# Patient Record
Sex: FEMALE | Race: WHITE | NOT HISPANIC OR LATINO | Employment: FULL TIME | URBAN - METROPOLITAN AREA
[De-identification: names, ages, dates, MRNs, and addresses within clinical notes are randomized per-mention and may not be internally consistent; named-entity substitution may affect disease eponyms.]

---

## 2017-03-02 ENCOUNTER — NURSE TRIAGE (OUTPATIENT)
Dept: ADMINISTRATIVE | Facility: CLINIC | Age: 37
End: 2017-03-02

## 2017-03-02 ENCOUNTER — TELEPHONE (OUTPATIENT)
Dept: INTERNAL MEDICINE | Facility: CLINIC | Age: 37
End: 2017-03-02

## 2017-03-02 NOTE — TELEPHONE ENCOUNTER
Called pt back she wants to be seen asap she is worred and wants to see her Dr she states that she is overwhelmed and depressed and does not know what else to do at this point. Scheduled for 3/6/17 at 1130

## 2017-03-02 NOTE — TELEPHONE ENCOUNTER
Reason for Disposition   Patient wants to be seen    Protocols used: ST DEPRESSION-A-OH  Patient states she is depressed. She is crying on and off. She is currently at work. Patient requesting to be seen on next Monday. Gave patient resources for urgent care or to call back if symptoms worsen.

## 2017-03-06 ENCOUNTER — OFFICE VISIT (OUTPATIENT)
Dept: INTERNAL MEDICINE | Facility: CLINIC | Age: 37
End: 2017-03-06
Payer: COMMERCIAL

## 2017-03-06 VITALS
SYSTOLIC BLOOD PRESSURE: 127 MMHG | DIASTOLIC BLOOD PRESSURE: 84 MMHG | WEIGHT: 150 LBS | HEART RATE: 70 BPM | HEIGHT: 65 IN | BODY MASS INDEX: 24.99 KG/M2

## 2017-03-06 DIAGNOSIS — F43.21 ADJUSTMENT DISORDER WITH DEPRESSED MOOD: Primary | ICD-10-CM

## 2017-03-06 PROCEDURE — 99214 OFFICE O/P EST MOD 30 MIN: CPT | Mod: S$GLB,,, | Performed by: INTERNAL MEDICINE

## 2017-03-06 PROCEDURE — 1160F RVW MEDS BY RX/DR IN RCRD: CPT | Mod: S$GLB,,, | Performed by: INTERNAL MEDICINE

## 2017-03-06 PROCEDURE — 99999 PR PBB SHADOW E&M-EST. PATIENT-LVL III: CPT | Mod: PBBFAC,,, | Performed by: INTERNAL MEDICINE

## 2017-03-06 RX ORDER — BUPROPION HYDROCHLORIDE 150 MG/1
150 TABLET ORAL DAILY
Qty: 30 TABLET | Refills: 11 | Status: SHIPPED | OUTPATIENT
Start: 2017-03-06 | End: 2018-03-22

## 2017-03-06 RX ORDER — CLONAZEPAM 1 MG/1
1 TABLET ORAL NIGHTLY
Qty: 10 TABLET | Refills: 0 | Status: SHIPPED | OUTPATIENT
Start: 2017-03-06 | End: 2018-03-22

## 2017-03-06 NOTE — MR AVS SNAPSHOT
[facility] [address]  [address]  [address]  [facility] [address]  Phone: [phone]                  Betty Bill   3/6/2017 11:30 AM   Office Visit    Description:  Female : 1980   Provider:  Stewart Hurd MD   Department:  [facility] [address]           Reason for Visit     Depression     Anxiety           Diagnoses this Visit        Comments    Adjustment disorder with depressed mood    -  Primary            To Do List           Goals (5 Years of Data)     None       These Medications        Disp Refills Start End    buPROPion (WELLBUTRIN XL) 150 MG TB24 tablet 30 tablet 11 3/6/2017 3/6/2018    Take 1 tablet (150 mg total) by mouth once daily. - Oral    Pharmacy: [facility] [address] [place] [address] [facility] [address] Ph #: [phone]       clonazePAM (KLONOPIN) 1 MG tablet 10 tablet 0 3/6/2017 3/16/2017    Take 1 tablet (1 mg total) by mouth every evening. - Oral    Ph[facility]: [facility] [address] [place] [address] [facility] [address] Ph #: [phone]         [facility]     [facility] -  Assistance  Registered nurses in the [facility] Center provide clinical advisement, health education, appointment booking, and other advisory services.  Call for this free service at [phone].             Medications           Message regarding Medications     Verify the changes and/or additions to your medication regime listed below are the same as discussed with your clinician today.  If any of these changes or additions are incorrect, please notify your healthcare provider.        START taking these NEW medications        Refills    buPROPion (WELLBUTRIN XL) 150 MG TB24 tablet 11    Sig: Take 1 tablet (150 mg total) by mouth once daily.    Class: Normal    Route: Oral    clonazePAM (KLONOPIN) 1 MG tablet 0    Sig: Take 1 tablet (1 mg total)  "by mouth every evening.    Class: Normal    Route: Oral           Verify that the below list of medications is an accurate representation of the medications you are currently taking.  If none reported, the list may be blank. If incorrect, please contact your healthcare provider. Carry this list with you in case of emergency.           Current Medications     amitriptyline (ELAVIL) 10 MG tablet Take 1 tablet (10 mg total) by mouth every evening.    amoxicillin (AMOXIL) 875 MG tablet Take 1 tablet (875 mg total) by mouth 2 (two) times daily.    buPROPion (WELLBUTRIN XL) 150 MG TB24 tablet Take 1 tablet (150 mg total) by mouth once daily.    clonazePAM (KLONOPIN) 1 MG tablet Take 1 tablet (1 mg total) by mouth every evening.    dextroamphetamine-amphetamine (ADDERALL) 20 mg tablet Take 1 tablet (20 mg total) by mouth 2 (two) times daily.    dextroamphetamine-amphetamine (ADDERALL) 20 mg tablet Take 1 tablet (20 mg total) by mouth 2 (two) times daily.    dextroamphetamine-amphetamine (ADDERALL) 20 mg tablet Take 1 tablet (20 mg total) by mouth 2 (two) times daily.           Clinical Reference Information           Your Vitals Were     BP Pulse Height Weight Last Period BMI    127/84 70 5' 5" (1.651 m) 68 kg (150 lb) 02/23/2017 24.96 kg/m2      Blood Pressure          Most Recent Value    BP  127/84      Allergies as of 3/6/2017     No Known Allergies      Immunizations Administered on Date of Encounter - 3/6/2017     None      [facility] Sign-Up     Activating your [facility] account is as easy as 1-2-3!     1) Visit [email or web address], select Sign Up Now, enter this activation code and your date of birth, then select Next.  [email or web address]  Expires: 4/20/2017 12:36 PM      2) Create a username and password to use when you visit [facility] in the future and select a security question in case you lose your password and select Next.    3) Enter your e-mail address and click Sign Up!    Additional Information  If you have " questions, please e-mail [email or web address] or call [phone] to talk to our [facility] staff. Remember, [facility] is NOT to be used for urgent needs. For medical emergencies, dial 911.         Smoking Cessation     If you would like to quit smoking:   You may be eligible for free services if you are a [place] resident and started smoking cigarettes before September 1, 1988.  Call the Smoking Cessation Trust ([facility]) toll free at (159) 164-3425 or (516) 273-7692.   Call 9-287-QUIT-NOW if you do not meet the above criteria.            Language Assistance Services     ATTENTION: Language assistance services are available, free of charge. Please call [phone].      ATENCIÓN: Si tony rojas, tiene a fatima disposición servicios gratuitos de asistencia lingüística. Llame al [phone].     CHÚ Ý: N?u b?n nói Ti?ng Vi?t, có các d?ch v? h? tr? ngôn ng? mi?n phí dành cho b?n. G?i s? [phone].         [facility] [address] complies with applicable Federal civil rights laws and does not discriminate on the basis of race, color, national origin, age, disability, or sex.

## 2017-03-06 NOTE — PROGRESS NOTES
REASON FOR VISIT:  This is a 36-year-old female.  For two months now, she has   been just feeling very down, depressed, tired all the time, not sleeping well,   and crying a lot.  She just feels overwhelmed with things in life with two   children, her , and work.  Her parents are both ill.  Her daughter is   getting  in August.  She has had bouts of depression in the past.  One   time Effexor was given, but she said it did not really help and she also gained   weight.  Physically, there has been no chest pain, shortness of breath, or   abdominal pain.  Bowel and urine function are fine.  Occasional headache.    PAST MEDICAL HISTORY:  Attention deficit disorder.  Depression, anxiety.  Tobacco use.    MEDICATIONS:  None.  In the past, she was on Adderall.    She was seen in the Emergency Room for numbness involving the extremities and   arms.  An evaluation was unrevealing including a drug panel other than alcohol   intake at the time.    SOCIAL HISTORY:  Tobacco use, about half a pack a day.  Alcohol use, very   limited.    PHYSICAL EXAMINATION:  VITAL SIGNS:  Weight is 150, pulse 72, blood pressure 130/82.  LUNGS:  Clear.  HEART:  Regular rate and rhythm.  ABDOMEN:  Active bowel sounds, soft, and nontender.  NECK:  No thyromegaly.  In the past, thyroid function was fine.    IMPRESSION:  Adjustment disorder with depressed features.    PLAN:  Wellbutrin  mg once a day for a month and clonazepam 1 mg at   bedtime.  In a couple of weeks, I would like her to give me a report on how she   is doing.    /sc [id number] review      JOSTIN/CROW  dd: 03/06/2017 12:34:24 (CST)  td: 03/07/2017 00:53:58 (CST)  Doc ID   #[id number]  Job ID #[id number]    CC:

## 2017-03-08 ENCOUNTER — TELEPHONE (OUTPATIENT)
Dept: INTERNAL MEDICINE | Facility: CLINIC | Age: 37
End: 2017-03-08

## 2017-03-08 NOTE — TELEPHONE ENCOUNTER
----- Message from Sarah Salazar MA sent at 3/8/2017 12:41 PM CST -----  Contact: self- [phone]  Pt is requesting to Extend her 's note until Tuesday 03/14/17.  Pls fax to [phone]...ATTN: Nell.

## 2017-03-08 NOTE — TELEPHONE ENCOUNTER
Called pt back. Pt states that her boss suggested that she take a medical leave until she can get back to herself. She is going to fax over the paper work needing to be signed.

## 2017-03-08 NOTE — TELEPHONE ENCOUNTER
Called pt back she is stating that she knows that the Dr may take a few days to fill out the paper work so she is asking for a Drs note excussing her for 3/7/17-3/14/17 to cover her for these days and then she is needing the papers filled out for an extended leave per her boss states that she is unstable and needs to take a leave of absences from work until the medication has time to take affect and she sttrs feeling back to her normal self. This paper work is needed to hold her job position while out of work.

## 2017-03-08 NOTE — TELEPHONE ENCOUNTER
----- Message from Ashlee Gorman sent at 3/7/2017 12:41 PM CST -----  Contact: self/[phone]  Pt called in regards to talking to the office about her depression.        Please advise

## 2017-03-15 ENCOUNTER — OFFICE VISIT (OUTPATIENT)
Dept: INTERNAL MEDICINE | Facility: CLINIC | Age: 37
End: 2017-03-15
Payer: COMMERCIAL

## 2017-03-15 VITALS
WEIGHT: 154 LBS | SYSTOLIC BLOOD PRESSURE: 125 MMHG | HEIGHT: 65 IN | BODY MASS INDEX: 25.66 KG/M2 | HEART RATE: 71 BPM | DIASTOLIC BLOOD PRESSURE: 80 MMHG

## 2017-03-15 DIAGNOSIS — F43.21 ADJUSTMENT DISORDER WITH DEPRESSED MOOD: ICD-10-CM

## 2017-03-15 DIAGNOSIS — F32.A DEPRESSION, UNSPECIFIED DEPRESSION TYPE: Primary | ICD-10-CM

## 2017-03-15 PROCEDURE — 99999 PR PBB SHADOW E&M-EST. PATIENT-LVL III: CPT | Mod: PBBFAC,,, | Performed by: INTERNAL MEDICINE

## 2017-03-15 PROCEDURE — 1160F RVW MEDS BY RX/DR IN RCRD: CPT | Mod: S$GLB,,, | Performed by: INTERNAL MEDICINE

## 2017-03-15 PROCEDURE — 99212 OFFICE O/P EST SF 10 MIN: CPT | Mod: S$GLB,,, | Performed by: INTERNAL MEDICINE

## 2017-03-15 NOTE — PROGRESS NOTES
REASON FOR VISIT:  Ms. Bill is here as a follow-up on depression in reference   to her visit on March 6.  She has been out of work since that time and does   require a form filled out for leave of absence.  She has had breakdowns at work   and to the point where her supervisor suggested that she take this.  She has not   seen much of a change with the Wellbutrin, although it has only been a week.    Clonazepam 1 mg at bedtime has been helpful for sleep.  She still feels very   down, depressed, crying, and agitated.    IMPRESSION:  Adjustment disorder with depressed features with possible major   depression.    PLAN:  We will refer her to the Behavioral Medical Unit and possibly Psychiatry   in the future.  For the time being, she can stay with Wellbutrin, and her form   was filled out.    /sc [id number] review      JAM/CROW  dd: 03/15/2017 12:29:39 (CDT)  td: 03/16/2017 00:39:28 (CDT)  Doc ID   #[id number]  Job ID #[id number]    CC:

## 2017-03-15 NOTE — MR AVS SNAPSHOT
[facility] [address]  [address]  [address]  [facility] [address]  Phone: [phone]                  Betty Bill   3/15/2017 11:30 AM   Office Visit    Description:  Female : 1980   Provider:  Stewart Hurd MD   Department:  [facility] [address]           Reason for Visit     Follow-up           Diagnoses this Visit        Comments    Depression, unspecified depression type    -  Primary     Adjustment disorder with depressed mood                To Do List           Goals (5 Years of Data)     None      [facility]     [facility] Care [facility] -  Assistance  Registered nurses in the [facility] provide clinical advisement, health education, appointment booking, and other advisory services.  Call for this free service at [phone].             Medications           Message regarding Medications     Verify the changes and/or additions to your medication regime listed below are the same as discussed with your clinician today.  If any of these changes or additions are incorrect, please notify your healthcare provider.             Verify that the below list of medications is an accurate representation of the medications you are currently taking.  If none reported, the list may be blank. If incorrect, please contact your healthcare provider. Carry this list with you in case of emergency.           Current Medications     amitriptyline (ELAVIL) 10 MG tablet Take 1 tablet (10 mg total) by mouth every evening.    amoxicillin (AMOXIL) 875 MG tablet Take 1 tablet (875 mg total) by mouth 2 (two) times daily.    buPROPion (WELLBUTRIN XL) 150 MG TB24 tablet Take 1 tablet (150 mg total) by mouth once daily.    clonazePAM (KLONOPIN) 1 MG tablet Take 1 tablet (1 mg total) by mouth every evening.    dextroamphetamine-amphetamine (ADDERALL) 20 mg tablet Take 1 tablet (20 mg total) by mouth 2 (two) times daily.    dextroamphetamine-amphetamine  "(ADDERALL) 20 mg tablet Take 1 tablet (20 mg total) by mouth 2 (two) times daily.    dextroamphetamine-amphetamine (ADDERALL) 20 mg tablet Take 1 tablet (20 mg total) by mouth 2 (two) times daily.           Clinical Reference Information           Your Vitals Were     BP Pulse Height Weight Last Period BMI    125/80 71 5' 5" (1.651 m) 69.9 kg (154 lb) 02/23/2017 25.63 kg/m2      Blood Pressure          Most Recent Value    BP  125/80      Allergies as of 3/15/2017     No Known Allergies      Immunizations Administered on Date of Encounter - 3/15/2017     None      Orders Placed During Today's Visit     Future Labs/Procedures Expected by Expires    Behavioral health day treatment  As directed 3/15/2018      [facility] Sign-Up     Activating your [facility] account is as easy as 1-2-3!     1) Visit [email or web address], select Sign Up Now, enter this activation code and your date of birth, then select Next.  [email or web address]  Expires: 4/20/2017  1:36 PM      2) Create a username and password to use when you visit [facility] in the future and select a security question in case you lose your password and select Next.    3) Enter your e-mail address and click Sign Up!    Additional Information  If you have questions, please e-mail [email or web address] or call [phone] to talk to our [facility] staff. Remember, [facility] is NOT to be used for urgent needs. For medical emergencies, dial 911.         Smoking Cessation     If you would like to quit smoking:   You may be eligible for free services if you are a [place] resident and started smoking cigarettes before September 1, 1988.  Call the Smoking Cessation Trust ([facility]) toll free at (688) 994-4326 or (853) 721-8993.   Call 1-800-QUIT-NOW if you do not meet the above criteria.            Language Assistance Services     ATTENTION: Language assistance services are available, free of charge. Please call [phone].      ATENCIÓN: Si manan fierro a fatima disposición " servicios gratuitos de asistencia lingüística. Maddie robertson [phone].     [facility] Ý: N?u b?n nói Ti?ng Vi?t, có các d?ch v? h? tr? ngôn ng? mi?n phí dành cho b?n. G?i s? [phone].         [facility] [address] complies with applicable Federal civil rights laws and does not discriminate on the basis of race, color, national origin, age, disability, or sex.

## 2017-06-29 ENCOUNTER — OFFICE VISIT (OUTPATIENT)
Dept: INTERNAL MEDICINE | Facility: CLINIC | Age: 37
End: 2017-06-29
Payer: COMMERCIAL

## 2017-06-29 VITALS
HEIGHT: 65 IN | BODY MASS INDEX: 26.04 KG/M2 | DIASTOLIC BLOOD PRESSURE: 82 MMHG | SYSTOLIC BLOOD PRESSURE: 129 MMHG | WEIGHT: 156.31 LBS | HEART RATE: 74 BPM

## 2017-06-29 DIAGNOSIS — R63.5 WEIGHT GAIN: ICD-10-CM

## 2017-06-29 DIAGNOSIS — F43.23 ADJUSTMENT DISORDER WITH MIXED ANXIETY AND DEPRESSED MOOD: Primary | ICD-10-CM

## 2017-06-29 PROCEDURE — 99999 PR PBB SHADOW E&M-EST. PATIENT-LVL III: CPT | Mod: PBBFAC,,, | Performed by: INTERNAL MEDICINE

## 2017-06-29 PROCEDURE — 99214 OFFICE O/P EST MOD 30 MIN: CPT | Mod: S$GLB,,, | Performed by: INTERNAL MEDICINE

## 2017-06-29 RX ORDER — PHENTERMINE HYDROCHLORIDE 37.5 MG/1
37.5 TABLET ORAL
Qty: 30 TABLET | Refills: 0 | Status: SHIPPED | OUTPATIENT
Start: 2017-06-29 | End: 2017-08-01 | Stop reason: SDUPTHER

## 2017-06-29 NOTE — PROGRESS NOTES
REASON FOR VISIT:  This is a 36-year-old female who comes in for follow-up from   her visit in March. She was started on Wellbutrin  mg.  Her situation has   improved.  She finds that the medicine has helped emotionally.  She is not as   depressed, down, or anxious.  She has a lot of positive circumstances.  She and   her  are now  and she is happy.  She is also living with her   parents and  she is now able to take care of them because they have illnesses.    Her daughter is getting ready to be  and this is a positive thing in her   life.  She is ready to go back to work.  The only concern she has is still her   weight.  In the past she took phentermine as needed.    PAST MEDICAL HISTORY:  Attention deficit disorder.  Anxiety disorder.    SOCIAL HISTORY:  She now smokes maybe a pack a day instead of two packs a day.    I was hoping that Wellbutrin would improve this.    PHYSICAL EXAMINATION:  VITAL SIGNS:  She has a weight of 156, pulse 72, blood pressure 130/72.  LUNGS:  Clear.  HEART:  Regular rate and rhythm.    IMPRESSION:  1.  Adjustment disorder with mixed features, improving.  2.  Weight gain.    PLAN:  We will give a prescription for phentermine 30 mg once a day.  She will   continue with Wellbutrin.  Discussed attempts to discontinue smoking and engage   in proper diet and physical activity.    /sc [id number] review      JAM/CROW  dd: 06/29/2017 11:27:13 (CDT)  td: 07/13/2017 13:13:09 (CDT)  Doc ID   #[id number]  Job ID #[id number]    CC:

## 2017-08-01 ENCOUNTER — TELEPHONE (OUTPATIENT)
Dept: INTERNAL MEDICINE | Facility: CLINIC | Age: 37
End: 2017-08-01

## 2017-08-01 RX ORDER — PHENTERMINE HYDROCHLORIDE 37.5 MG/1
37.5 TABLET ORAL
Qty: 30 TABLET | Refills: 0 | Status: SHIPPED | OUTPATIENT
Start: 2017-08-01 | End: 2017-08-25 | Stop reason: SDUPTHER

## 2017-08-01 NOTE — TELEPHONE ENCOUNTER
----- Message from Toi Wolfe sent at 8/1/2017  1:06 PM CDT -----  Contact: [phone]  Type: Rx    Name of medication(s): phentermine (ADIPEX-P) 37.5 mg tablet    Is this a refill? New rx? Refill    Who prescribed medication? Dr Hurd    [facility]    Comments:Please advice    Thanks

## 2017-08-25 NOTE — TELEPHONE ENCOUNTER
----- Message from Vin Elkinsr sent at 8/25/2017  2:53 PM CDT -----  Contact: self/[phone] cell  Type: Rx    Name of medication(s): phentermine (ADIPEX-P) 37.5 mg tablet    Is this a refill? New rx? refill    Who prescribed medication? Dr. Hurd    Pharmacy Name, Phone, & Location: [facility] on file    Comments: pt would like to request a refill on the medication above.  She would like to request that it be sent off today, because she will be leaving [facility] on Monday. Please call and advise.  Thank you

## 2017-08-27 RX ORDER — PHENTERMINE HYDROCHLORIDE 37.5 MG/1
37.5 TABLET ORAL
Qty: 30 TABLET | Refills: 0 | Status: SHIPPED | OUTPATIENT
Start: 2017-09-01 | End: 2017-10-09 | Stop reason: SDUPTHER

## 2017-10-09 ENCOUNTER — TELEPHONE (OUTPATIENT)
Dept: INTERNAL MEDICINE | Facility: CLINIC | Age: 37
End: 2017-10-09

## 2017-10-09 RX ORDER — PHENTERMINE HYDROCHLORIDE 37.5 MG/1
37.5 TABLET ORAL
Qty: 30 TABLET | Refills: 0 | Status: SHIPPED | OUTPATIENT
Start: 2017-10-09 | End: 2017-12-21 | Stop reason: SDUPTHER

## 2017-10-09 NOTE — TELEPHONE ENCOUNTER
----- Message from Dangelo Tomlinson sent at 10/9/2017  7:58 AM CDT -----  Contact: patient  Patient is calling requesting for a refill of her medication. Please have Nurse to give her a call back. Patient's contact number is [phone]. Thank You.

## 2017-10-09 NOTE — TELEPHONE ENCOUNTER
----- Message from Alise Cadet sent at 10/9/2017  8:12 AM CDT -----  Contact: self [phone]  Type: Rx    Name of medication(s):  phentermine (ADIPEX-P) 37.5 mg tablet    Is this a refill? New rx? Refill      Who prescribed medication?    Pharmacy Name, Phone, & Location: [facility]  [phone]    Comments: please advise, Thanks !

## 2017-10-09 NOTE — TELEPHONE ENCOUNTER
Type: Rx     Name of medication(s):  phentermine (ADIPEX-P) 37.5 mg tablet     Is this a refill? New rx? Refill       Who prescribed medication?     Pharmacy Name, Phone, & Location: [facility]  [phone]     Comments: please advise, Thanks !

## 2017-11-10 NOTE — TELEPHONE ENCOUNTER
----- Message from Luis Parsons sent at 11/10/2017  3:13 PM CST -----  Contact: Patient [phone]  RX request - refill or new RX.  Is this a refill or new RX:  Refill  RX name and strength: phentermine (ADIPEX-P) 37.5 mg tablet    Pharmacy:  [facility] [address] [place] [address] AT [facility] OF [address]Comments:

## 2017-11-15 RX ORDER — PHENTERMINE HYDROCHLORIDE 37.5 MG/1
37.5 TABLET ORAL
Qty: 30 TABLET | Refills: 0 | Status: CANCELLED | OUTPATIENT
Start: 2017-11-15 | End: 2017-12-15

## 2017-11-15 NOTE — TELEPHONE ENCOUNTER
"Pt would like a call back from the nurse regarding this prescription     RX request - refill or new RX.   Is this a refill or new RX:  Refill   RX name and strength: generic for adapex   Directions:   Is this a 30 day or 90 day RX:     Pharmacy name and phone # (DON'T enter "on file" or "in chart"): [facility] [address] [place] [address] AT [facility] of [address] [phone] (Phone)   [phone] (Fax)   Comments:     "

## 2017-11-15 NOTE — TELEPHONE ENCOUNTER
"----- Message from Keesha Blank sent at 11/15/2017  1:23 PM CST -----  Contact: Pt [phone]  Pt would like a call back from the nurse regarding this prescription     RX request - refill or new RX.  Is this a refill or new RX:  Refill   RX name and strength: generic for adapex  Directions:   Is this a 30 day or 90 day RX:    Pharmacy name and phone # (DON'T enter "on file" or "in chart"): [facility] [address] [place] [address] AT [facility] [address] [phone] (Phone)  [phone] (Fax)  Comments:        "

## 2017-11-17 RX ORDER — PHENTERMINE HYDROCHLORIDE 37.5 MG/1
37.5 TABLET ORAL
Qty: 30 TABLET | Refills: 0 | Status: SHIPPED | OUTPATIENT
Start: 2017-11-17 | End: 2017-12-17

## 2017-12-21 ENCOUNTER — TELEPHONE (OUTPATIENT)
Dept: INTERNAL MEDICINE | Facility: CLINIC | Age: 37
End: 2017-12-21

## 2017-12-21 RX ORDER — PHENTERMINE HYDROCHLORIDE 37.5 MG/1
37.5 TABLET ORAL
Qty: 30 TABLET | Refills: 0 | Status: SHIPPED | OUTPATIENT
Start: 2017-12-21 | End: 2018-01-20

## 2017-12-21 NOTE — TELEPHONE ENCOUNTER
----- Message from Ashlee Gorman sent at 12/21/2017  1:25 PM CST -----  Contact: self/[phone]  Pt called in regards to getting a Rx refill for phentermine (ADIPEX-P) 37.5 mg tablet.      Please advise

## 2018-01-23 RX ORDER — PHENTERMINE HYDROCHLORIDE 37.5 MG/1
37.5 TABLET ORAL
Refills: 0 | Status: CANCELLED | OUTPATIENT
Start: 2018-01-23 | End: 2018-02-22

## 2018-01-23 NOTE — TELEPHONE ENCOUNTER
----- Message from Yaima Parker sent at 1/23/2018  8:48 AM CST -----  Contact: Patient [phone]  Prescription Request:     Name of medication: phentermine (ADIPEX-P) 37.5 mg tablet    Reason for request: Refill    Pharmacy: [facility] [address] [place] [address] AT [facility] OF [address]    Please advise.    Thank You

## 2018-01-24 ENCOUNTER — TELEPHONE (OUTPATIENT)
Dept: INTERNAL MEDICINE | Facility: CLINIC | Age: 38
End: 2018-01-24

## 2018-01-24 NOTE — TELEPHONE ENCOUNTER
----- Message from Yaima Parker sent at 1/24/2018  2:18 PM CST -----  Contact: Patient [phone]  Prescription Request:      Name of medication: phentermine (ADIPEX-P) 37.5 mg tablet     Reason for request: Refill     Pharmacy: [facility] [address] [place] [address] AT [facility] OF [address]     Please advise.     Thank You

## 2018-01-26 ENCOUNTER — TELEPHONE (OUTPATIENT)
Dept: INTERNAL MEDICINE | Facility: CLINIC | Age: 38
End: 2018-01-26

## 2018-01-26 RX ORDER — PHENTERMINE HYDROCHLORIDE 37.5 MG/1
37.5 TABLET ORAL
Qty: 30 TABLET | Refills: 0 | Status: SHIPPED | OUTPATIENT
Start: 2018-01-26 | End: 2018-02-20 | Stop reason: SDUPTHER

## 2018-01-26 NOTE — TELEPHONE ENCOUNTER
----- Message from Yaima Parker sent at 1/26/2018  2:12 PM CST -----  Contact: Patient [phone]  Prescription Request:      Name of medication: phentermine (ADIPEX-P) 37.5 mg tablet     Reason for request: Refill     Pharmacy: [facility] [address] [place] [address] AT [facility] OF [address]     Please advise. 2nd Request     Thank You

## 2018-02-20 RX ORDER — PHENTERMINE HYDROCHLORIDE 37.5 MG/1
37.5 TABLET ORAL
Qty: 30 TABLET | Refills: 0 | Status: SHIPPED | OUTPATIENT
Start: 2018-02-20 | End: 2018-03-20 | Stop reason: SDUPTHER

## 2018-02-20 NOTE — TELEPHONE ENCOUNTER
Pt needs a refill on phentermine (ADIPEX-P) 37.5 mg tablet called into pharmacy at [facility] [place] [address] AT [facility] OF [address]     Pt can be reached at [phone].     Thank You

## 2018-02-20 NOTE — TELEPHONE ENCOUNTER
----- Message from Rebekah Culver sent at 2/20/2018  2:26 PM CST -----  Contact: Pt  Pt needs a refill on phentermine (ADIPEX-P) 37.5 mg tablet called into pharmacy at [facility] [place] [address] AT [facility] OF [address]    Pt can be reached at [phone].    Thank You

## 2018-03-20 ENCOUNTER — TELEPHONE (OUTPATIENT)
Dept: INTERNAL MEDICINE | Facility: CLINIC | Age: 38
End: 2018-03-20

## 2018-03-20 NOTE — TELEPHONE ENCOUNTER
----- Message from Yaima Parker sent at 3/20/2018  1:14 PM CDT -----  Contact: Patient   Stated that she has been running a fever for over a week of  and has been very tired.    Also requesting a refill in Rx phentermine (ADIPEX-P) 37.5 mg tablet to [facility] [address] [place] [address] AT [facility] [address]    Thank You

## 2018-03-20 NOTE — TELEPHONE ENCOUNTER
Called pt back no answer left message on VM to call office back need to know why she needs to be seen next week

## 2018-03-20 NOTE — TELEPHONE ENCOUNTER
----- Message from Chichi Winston sent at 3/20/2018 10:01 AM CDT -----  Contact: Pt  Pt needs to get a refill on medication and needs to been seen next week says she cannot miss work and will be off next week. 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

## 2018-03-21 NOTE — TELEPHONE ENCOUNTER
"----- Message from Eladia Hall sent at 3/21/2018  2:45 PM CDT -----  Contact: ansyxri-[phone]  RX request - refill or new RX.  Is this a refill or new RX:  New rx  RX name and strength: phentermine (ADIPEX-P) 37.5 mg tablet  Directions:   Is this a 30 day or 90 day RX:    Pharmacy name and phone # (DON'T enter "on file" or "in chart"): [facility] [phone] (Phone)  [phone] (Fax)  Comments:          "

## 2018-03-22 ENCOUNTER — OFFICE VISIT (OUTPATIENT)
Dept: INTERNAL MEDICINE | Facility: CLINIC | Age: 38
End: 2018-03-22
Payer: COMMERCIAL

## 2018-03-22 ENCOUNTER — LAB VISIT (OUTPATIENT)
Dept: LAB | Facility: HOSPITAL | Age: 38
End: 2018-03-22
Attending: INTERNAL MEDICINE
Payer: COMMERCIAL

## 2018-03-22 VITALS
DIASTOLIC BLOOD PRESSURE: 72 MMHG | SYSTOLIC BLOOD PRESSURE: 128 MMHG | HEIGHT: 65 IN | HEART RATE: 88 BPM | BODY MASS INDEX: 27.4 KG/M2 | TEMPERATURE: 98 F | WEIGHT: 164.44 LBS

## 2018-03-22 DIAGNOSIS — R50.9 FEVER, UNSPECIFIED FEVER CAUSE: Primary | ICD-10-CM

## 2018-03-22 DIAGNOSIS — R10.32 LLQ ABDOMINAL PAIN: ICD-10-CM

## 2018-03-22 DIAGNOSIS — R50.9 FEVER, UNSPECIFIED FEVER CAUSE: ICD-10-CM

## 2018-03-22 LAB
BASOPHILS # BLD AUTO: 0.11 K/UL
BASOPHILS NFR BLD: 0.9 %
BILIRUB UR QL STRIP: NEGATIVE
CLARITY UR REFRACT.AUTO: CLEAR
COLOR UR AUTO: NORMAL
DIFFERENTIAL METHOD: ABNORMAL
EOSINOPHIL # BLD AUTO: 0.6 K/UL
EOSINOPHIL NFR BLD: 5.3 %
ERYTHROCYTE [DISTWIDTH] IN BLOOD BY AUTOMATED COUNT: 14.6 %
GLUCOSE UR QL STRIP: NEGATIVE
HCT VFR BLD AUTO: 38.6 %
HGB BLD-MCNC: 12.5 G/DL
HGB UR QL STRIP: NEGATIVE
IMM GRANULOCYTES # BLD AUTO: 0.04 K/UL
IMM GRANULOCYTES NFR BLD AUTO: 0.3 %
KETONES UR QL STRIP: NEGATIVE
LEUKOCYTE ESTERASE UR QL STRIP: NEGATIVE
LYMPHOCYTES # BLD AUTO: 2.8 K/UL
LYMPHOCYTES NFR BLD: 23.3 %
MCH RBC QN AUTO: 29.1 PG
MCHC RBC AUTO-ENTMCNC: 32.4 G/DL
MCV RBC AUTO: 90 FL
MONOCYTES # BLD AUTO: 0.8 K/UL
MONOCYTES NFR BLD: 6.7 %
NEUTROPHILS # BLD AUTO: 7.7 K/UL
NEUTROPHILS NFR BLD: 63.5 %
NITRITE UR QL STRIP: NEGATIVE
NRBC BLD-RTO: 0 /100 WBC
PH UR STRIP: 7 [PH] (ref 5–8)
PLATELET # BLD AUTO: 380 K/UL
PMV BLD AUTO: 10.7 FL
PROT UR QL STRIP: NEGATIVE
RBC # BLD AUTO: 4.29 M/UL
SP GR UR STRIP: 1 (ref 1–1.03)
URN SPEC COLLECT METH UR: NORMAL
UROBILINOGEN UR STRIP-ACNC: NEGATIVE EU/DL
WBC # BLD AUTO: 12.12 K/UL

## 2018-03-22 PROCEDURE — 99214 OFFICE O/P EST MOD 30 MIN: CPT | Mod: S$GLB,,, | Performed by: INTERNAL MEDICINE

## 2018-03-22 PROCEDURE — 85025 COMPLETE CBC W/AUTO DIFF WBC: CPT

## 2018-03-22 PROCEDURE — 99999 PR PBB SHADOW E&M-EST. PATIENT-LVL III: CPT | Mod: PBBFAC,,, | Performed by: INTERNAL MEDICINE

## 2018-03-22 PROCEDURE — 84443 ASSAY THYROID STIM HORMONE: CPT

## 2018-03-22 PROCEDURE — 81003 URINALYSIS AUTO W/O SCOPE: CPT

## 2018-03-22 PROCEDURE — 36415 COLL VENOUS BLD VENIPUNCTURE: CPT | Mod: PO

## 2018-03-22 PROCEDURE — 87086 URINE CULTURE/COLONY COUNT: CPT

## 2018-03-22 PROCEDURE — 80053 COMPREHEN METABOLIC PANEL: CPT

## 2018-03-22 PROCEDURE — 86140 C-REACTIVE PROTEIN: CPT

## 2018-03-22 RX ORDER — PHENTERMINE HYDROCHLORIDE 37.5 MG/1
37.5 TABLET ORAL
Qty: 30 TABLET | Refills: 0 | Status: SHIPPED | OUTPATIENT
Start: 2018-03-22 | End: 2018-04-19 | Stop reason: SDUPTHER

## 2018-03-22 NOTE — PROGRESS NOTES
REASON FOR VISIT:  This is a 37-year-old female.  For two weeks, she has been   running  fever, morning, afternoon and late evening where it can be anywhere   from 99 or above.  She would feel cold.  She does feel tired and fatigued.    Other symptoms sometimes lower back with ache as well as an ache in the left   lower quadrant.  No change in bowel function.  For urination, at times she does   not feel that she is urinating as frequently as urgently, although it does not   hurt.  She has been somewhat down and depressed; mother passed away three weeks   ago.  Actually for a few months, she has stopped smoking, but then right before   mother passed away she get back to the smoking.    PAST MEDICAL HISTORY:  Prior history of depression and attention deficit.    Current medicine since that time has been Adipex.    PHYSICAL EXAMINATION:  VITAL SIGNS:  Weight is 164 pounds, temperature is 98.1, pulse 88, blood   pressure by me 128/72.  HEENT:  Oropharynx, no abnormal findings.  LUNGS:  Clear.  HEART:  Regular rate and rhythm.  ABDOMEN:  Active bowel sounds, soft, nontender.  MUSCULOSKELETAL:  No discomfort over the back.  SKIN:  No abnormal findings.    IMPRESSION:  1.  Fever.  2.  Abdominal pain.    PLAN:  Chemistry, CBC, TSH, inflammatory markers and urinalysis.  Phone review   to followup.          /willian [id number] review        JOSTIN/CROW  dd: 03/22/2018 16:29:38 (CDT)  td: 03/23/2018 11:47:48 (CDT)  Doc ID   #[id number]  Job ID #[id number]    CC:

## 2018-03-23 LAB
ALBUMIN SERPL BCP-MCNC: 3.8 G/DL
ALP SERPL-CCNC: 73 U/L
ALT SERPL W/O P-5'-P-CCNC: 16 U/L
ANION GAP SERPL CALC-SCNC: 10 MMOL/L
AST SERPL-CCNC: 16 U/L
BACTERIA UR CULT: NO GROWTH
BILIRUB SERPL-MCNC: 0.2 MG/DL
BUN SERPL-MCNC: 16 MG/DL
CALCIUM SERPL-MCNC: 8.9 MG/DL
CHLORIDE SERPL-SCNC: 104 MMOL/L
CO2 SERPL-SCNC: 25 MMOL/L
CREAT SERPL-MCNC: 0.7 MG/DL
CRP SERPL-MCNC: 7.8 MG/L
EST. GFR  (AFRICAN AMERICAN): >60 ML/MIN/1.73 M^2
EST. GFR  (NON AFRICAN AMERICAN): >60 ML/MIN/1.73 M^2
GLUCOSE SERPL-MCNC: 91 MG/DL
POTASSIUM SERPL-SCNC: 4.2 MMOL/L
PROT SERPL-MCNC: 6.8 G/DL
SODIUM SERPL-SCNC: 139 MMOL/L
TSH SERPL DL<=0.005 MIU/L-ACNC: 0.92 UIU/ML

## 2018-03-23 NOTE — TELEPHONE ENCOUNTER
Called pt back and explained that we called in her Rx to the pharmacy and she asked about labs and I explained they were still in process and if they become available before I leave I will call her back

## 2018-03-24 ENCOUNTER — TELEPHONE (OUTPATIENT)
Dept: INTERNAL MEDICINE | Facility: CLINIC | Age: 38
End: 2018-03-24

## 2018-03-24 NOTE — TELEPHONE ENCOUNTER
Call patient     let her know the lab testing from her visit looked fine       this included a complete chemistry, blood count, inflammation markers, urine culture      Let me know if she continues to have episodes of fever or any other symptoms

## 2018-03-26 ENCOUNTER — TELEPHONE (OUTPATIENT)
Dept: INTERNAL MEDICINE | Facility: CLINIC | Age: 38
End: 2018-03-26

## 2018-03-26 NOTE — TELEPHONE ENCOUNTER
Call patient   her lab testing was fine   continue to monitor her temperature    it will be best to keep a temperature log with the use of a thermometer     if the fever persists or keeps reoccurring and no other symptoms are noted , will need to refer to infectious disease

## 2018-03-26 NOTE — TELEPHONE ENCOUNTER
Pt states she think she is still running fever but yesterday she felt fine . Right now she feels fine. The fever is the only symptom and its on and off.

## 2018-04-19 RX ORDER — PHENTERMINE HYDROCHLORIDE 37.5 MG/1
37.5 TABLET ORAL
Qty: 30 TABLET | Refills: 0 | Status: SHIPPED | OUTPATIENT
Start: 2018-04-19 | End: 2018-05-19

## 2018-04-19 NOTE — TELEPHONE ENCOUNTER
"RX request - refill or new RX.   Is this a refill or new RX:  Refill   RX name and strength: phentermine (ADIPEX-P) 37.5 mg tablet   Directions:   Is this a 30 day or 90 day RX:  30   Pharmacy name and phone # (DON'T enter "on file" or "in chart"): [facility]  [phone] (Phone) [phone] (Fax)   Comments:     "

## 2018-04-19 NOTE — TELEPHONE ENCOUNTER
"----- Message from Sarah Champagne sent at 4/19/2018  3:16 PM CDT -----  Contact: Self Call  [phone]  RX request - refill or new RX.  Is this a refill or new RX:  Refill  RX name and strength: phentermine (ADIPEX-P) 37.5 mg tablet  Directions:   Is this a 30 day or 90 day RX:  30  Pharmacy name and phone # (DON'T enter "on file" or "in chart"): [facility]  [phone] (Phone) [phone] (Fax)  Comments:        "

## 2018-06-01 ENCOUNTER — TELEPHONE (OUTPATIENT)
Dept: INTERNAL MEDICINE | Facility: CLINIC | Age: 38
End: 2018-06-01

## 2018-06-01 RX ORDER — PHENTERMINE HYDROCHLORIDE 37.5 MG/1
TABLET ORAL
Refills: 0 | COMMUNITY
Start: 2018-04-19 | End: 2018-06-01 | Stop reason: SDUPTHER

## 2018-06-01 RX ORDER — PHENTERMINE HYDROCHLORIDE 37.5 MG/1
37.5 TABLET ORAL DAILY
Qty: 30 TABLET | Refills: 0 | Status: SHIPPED | OUTPATIENT
Start: 2018-06-01 | End: 2018-07-11 | Stop reason: SDUPTHER

## 2018-06-01 NOTE — TELEPHONE ENCOUNTER
----- Message from Jeannine Xie sent at 6/1/2018  2:03 PM CDT -----  Contact: self   [phone]  Pt is calling to request medication refill for Phentermine 37.5 mg this is for diet supplement      [facility] [address] [facility]  Phone [phone]      Thank you!

## 2018-07-11 NOTE — TELEPHONE ENCOUNTER
----- Message from Yudy Oswald sent at 7/11/2018 11:50 AM CDT -----  Contact: prt  Pt would like new prescription on   phentermine (ADIPEX-P) 37.5 mg tablet 30 tablet 0 6/1/2018    Sig - Route: Take 1 tablet (37.5 mg total) by mouth once daily. - Oral   E-Prescribing Status: Receipt confirmed by pharmacy (6/1/2018  4:39 PM CDT)       Pt can be reached at - ( [phone])            [facility] [employer] [address] [place] [address] AT [facility] [address] [phone] (Phone)  [phone] (Fax)

## 2018-07-12 RX ORDER — PHENTERMINE HYDROCHLORIDE 37.5 MG/1
37.5 TABLET ORAL DAILY
Qty: 30 TABLET | Refills: 0 | Status: SHIPPED | OUTPATIENT
Start: 2018-07-12 | End: 2018-08-23 | Stop reason: SDUPTHER

## 2018-07-12 NOTE — TELEPHONE ENCOUNTER
"----- Message from Jessicaholger Fisher sent at 7/12/2018  2:13 PM CDT -----  Contact: self/[phone]  RX request - refill or new RX.  Is this a refill or new RX: refill   RX name and strength: phentermine (ADIPEX-P) 37.5 mg tablet  Directions:  Take 1 tablet (37.5 mg total) by mouth once daily. - Oral  Is this a 30 day or 90 day RX:  30  Local pharmacy or mail order pharmacy:  Local  Pharmacy name and phone # (DON'T enter "on file" or "in chart"): [facility] [address] [place] [address] AT [facility] [address] [phone] (Phone) [phone] (Fax)  Comments:  Thank you        "

## 2018-07-30 ENCOUNTER — OFFICE VISIT (OUTPATIENT)
Dept: URGENT CARE | Facility: CLINIC | Age: 38
End: 2018-07-30
Payer: COMMERCIAL

## 2018-07-30 VITALS
BODY MASS INDEX: 27.32 KG/M2 | RESPIRATION RATE: 18 BRPM | SYSTOLIC BLOOD PRESSURE: 132 MMHG | HEIGHT: 65 IN | OXYGEN SATURATION: 98 % | WEIGHT: 164 LBS | TEMPERATURE: 97 F | HEART RATE: 85 BPM | DIASTOLIC BLOOD PRESSURE: 71 MMHG

## 2018-07-30 DIAGNOSIS — L30.9 DERMATITIS: Primary | ICD-10-CM

## 2018-07-30 PROCEDURE — 99214 OFFICE O/P EST MOD 30 MIN: CPT | Mod: 25,S$GLB,, | Performed by: NURSE PRACTITIONER

## 2018-07-30 PROCEDURE — 96372 THER/PROPH/DIAG INJ SC/IM: CPT | Mod: S$GLB,,, | Performed by: NURSE PRACTITIONER

## 2018-07-30 RX ORDER — METHYLPREDNISOLONE 4 MG/1
4 TABLET ORAL
Qty: 1 PACKAGE | Refills: 0 | Status: SHIPPED | OUTPATIENT
Start: 2018-07-31 | End: 2018-08-06

## 2018-07-30 RX ORDER — BETAMETHASONE SODIUM PHOSPHATE AND BETAMETHASONE ACETATE 3; 3 MG/ML; MG/ML
6 INJECTION, SUSPENSION INTRA-ARTICULAR; INTRALESIONAL; INTRAMUSCULAR; SOFT TISSUE
Status: COMPLETED | OUTPATIENT
Start: 2018-07-30 | End: 2018-07-30

## 2018-07-30 RX ORDER — MINERAL OIL
180 ENEMA (ML) RECTAL DAILY
Qty: 14 TABLET | Refills: 0 | COMMUNITY
Start: 2018-07-30 | End: 2018-08-18 | Stop reason: SDUPTHER

## 2018-07-30 RX ADMIN — BETAMETHASONE SODIUM PHOSPHATE AND BETAMETHASONE ACETATE 6 MG: 3; 3 INJECTION, SUSPENSION INTRA-ARTICULAR; INTRALESIONAL; INTRAMUSCULAR; SOFT TISSUE at 12:07

## 2018-07-30 NOTE — PATIENT INSTRUCTIONS
Please follow up with your Primary care provider within 2-5 days if your signs and symptoms have not resolved or worsen.     If your condition worsens or fails to improve we recommend that you receive another evaluation at the emergency room immediately or contact your primary medical clinic to discuss your concerns.    You must understand that you have received an Urgent Care treatment only and that you may be released before all of your medical problems are known or treated.   You, the patient, will arrange for follow up care as instructed.       Skin Rash    If your condition worsens or fails to improve we recommend that you receive another evaluation at the ER immediately or contact your PCP to discuss your concerns or return here.     If you develop additional symptoms such as tongue, mouth or throat swelling or any  trouble breathing go immediately to the ER or call 911.     You must understand that you've received an urgent care treatment only and that you may be released before all your medical problems are known or treated. You the patient will arrange for followup care as instructed.     Zyrtec, Claritin, Xyzal or Allegra should be used daily for the next 7-10 days to prevent or suppress the itching. You can take Benedryl 25 mg at bedtime as well.     Steroid Use    If you had a steroid injection in the clinic and you were given an oral steroid today, do not start the oral prescription until tomorrow.     As discussed, steroid use can elevate your blood pressure, elevate your blood sugar, water weight gain, nervous energy, redness to the face and dimpling of the skin at the injection site.     Oral steroids are hard on the stomach and should always be taken with food.      Once starting a steroid pack, you should complete it as directed.  If you abruptly discontinue the steroid, contact your PCP prior to doing this.      Please do not scratch the areas as they can become infected.  If you notice puss,  fever, redness, increased warmth or red streaking from the area, please see your PCP or Urgent care as soon as possible.          General Allergic Reactions  An allergic reaction is a set of symptoms caused by an allergen. An allergen is something that causes a persons immune system to react. When a person comes in contact with an allergen, it causes the body to release chemicals. These include the chemical histamine. Histamine causes swelling and itching. It may affect the entire body. This is called a general allergic reaction. Often symptoms affect only 1 part of the body. This is called a local allergic reaction.  You are having an allergic reaction. Almost anything can cause one. Different people are allergic to different things. It is usually something that you ate or swallowed, came into contact with by getting or putting it on your skin or clothes, or something you breathed in the air. This can be very annoying and sometimes scary.  Most of us think of allergic reactions when we have a rash or itchy skin. Symptoms can include:  · Itching of the eyes, nose, and roof of the mouth  · Runny or stuffy nose  · Watery eyes   · Sneezing or coughing   · A blocked feeling in the ear  · Red, itchy rash called hives  · Red and purple spots  · Rash, redness, welts, blisters  · Itching, burning, stinging, pain  · Dry, flaky, cracking, scaly skin  Severe symptoms include:  · Swelling of the face, lips, or other parts of the body  · Hoarse voice  · Trouble swallowing, feeling like your throat is closing  · Trouble breathing, wheezing  · Nausea, vomiting, diarrhea, stomach cramps  · Feeling faint or lightheaded, rapid heart rate  Sometimes the cause may be obvious. But there are so many things that can cause a reaction that you may not be able to figure out. The most important things to help find your allergen are:  · Remembering when it started  · What you were doing at the time or just before that  · Any activities you  were involved in  · Any new products or contacts  Below are some common causes. But remember that almost anything can cause a reaction. You may not even be aware that you came into contact with one of these things:  · Dust, mold, pollen  · Plants (common ones are poison ivy and poison oak, but there are many others)   · Animals  · Foods such as shrimp, shellfish, peanuts, milk products, gluten, and eggs. Also food colorings, flavorings, and additives.  · Insect bites or stings such as bees, mosquitos, fleas, ticks  · Medicines such as penicillin, sulfa medicines, amoxicillin, aspirin, and ibuprofen. But any medicine can cause a reaction.  · Jewelry such as nickel or gold. This can be new, or something youve worn for a while, including zippers and buttons.  · Latex such as in gloves, clothes, toys, balloons, or some tapes. Some people allergic to latex may also have problems with foods like bananas, avocados, kiwi, papaya, or chestnuts.  · Lotions, perfumes, cosmetics, soaps, shampoos, skincare products, nail products  · Chemicals or dyes in clothing, linen, , hair dyes, soaps, iodine  Many viruses and common colds can cause a rash that is not an allergic reaction. Sometimes it is hard to tell the difference between allergies, sensitivity, or an intolerance to something. This is especially true with food. Many things can cause diarrhea, vomiting, stomach cramps, and skin irritation.  Home care    The goal of treatment is to help relieve the symptoms and get you feeling better. The rash will usually fade over several days. But it can sometimes last a couple of weeks. Over the next couple of days, there may be times when it is gets a little worse, and then better again. Here are some things to do:  · If you know what you are allergic to, stay away from it. Future reactions could be worse than this one.  · Avoid tight clothing and anything that heats up your skin (hot showers or baths, direct sunlight). Heat  will make itching worse.  · An ice pack will relieve local areas of intense itching and redness. To make an ice pack, put ice cubes in a plastic bag that seals at the top. Wrap it in a thin, clean towel. Dont put the ice directly on the skin because it can damage the skin.  · Oral diphenhydramine is an over-the-counter antihistamine sold at pharmacy and grocery stores. Unless a prescription antihistamine was given, diphenhydramine may be used to reduce itching if large areas of the skin are involved. It may make you sleepy. So be careful using it in the daytime or when going to school, working, or driving. Note: Dont use diphenhydramine if you have glaucoma or if you are a man with trouble urinating due to an enlarged prostate. There are other antihistamines that wont make you so sleepy. These are good choices for daytime use. Ask your pharmacist for suggestions.  · Dont use diphenhydramine cream on your skin. It can cause a further reaction in some people.  · To help prevent an infection, don't scratch the affected area. Scratching may worsen the reaction and damage your skin. It can also lead to an infection. Always check the affected for signs of an infection.  · Call your healthcare provider and ask what you can use to help decrease the itching.  · To decrease allergic reactions, try the following:    · Use heat-steam to clean your home  · Use high-efficiency particulate (HEPA) vacuums and filters  · Stay away from food and pet triggers  · Kill any cockroaches  · Clean your house often  Follow-up care  Follow up with your healthcare provider, or as advised. If you had a severe reaction today, or if you have had several mild to medium allergic reactions in the past, ask your provider about allergy testing. This can help you find out what you are allergic to. If your reaction included dizziness, fainting, or trouble breathing or swallowing, ask your provider about carrying auto-injectable epinephrine.  Call  911  Call 911 if any of these occur:  · Trouble breathing or swallowing, wheezing  · Cool, moist, pale skin  · Shortness of breath  · Hoarse voice or trouble speaking  · Confused   · Very drowsy or trouble awakening  · Fainting or loss of consciousness  · Rapid heart rate  · Feeling of dizziness or weakness or a sudden drop in blood pressure  · Feeling of doom  · Feeling lightheaded  · Severe nausea or vomiting, or diarrhea  · Seizure  · Swelling in the face, eyelids, lips, mouth, throat or tongue  · Drooling  When to seek medical advice  Call your healthcare provider right away if any of these occur:  · Spreading areas of itching, redness or swelling  · Nausea or stomach cramps or abdominal pain  · Continuing or recurring symptoms  · Spreading areas of redness, swelling, or itching  · Signs of infection at the affected site:  ¨ Spreading redness  ¨ Increased pain or swelling  ¨ Fluid or colored drainage from the site  ¨ Fever of 100.4°F (38°C) or above lasting for 24 to 48 hours, or as directed by your provider  Date Last Reviewed: 3/1/2017  © 0668-7583 [employer]. [address] [place] [id number]. All rights reserved. This information is not intended as a substitute for professional medical care. Always follow your healthcare professional's instructions.

## 2018-07-30 NOTE — PROGRESS NOTES
"Subjective:       Patient ID: Betty Bill is a 37 y.o. female.    Vitals:  height is 5' 5" (1.651 m) and weight is 74.4 kg (164 lb). Her oral temperature is 97.1 °F (36.2 °C). Her blood pressure is 132/71 and her pulse is 85. Her respiration is 18 and oxygen saturation is 98%.     Chief Complaint: Rash    Patient says she used a new sunburn medication yesterday and then noted the rash.          Rash   This is a new problem. The current episode started yesterday. The problem has been gradually worsening since onset. The rash is diffuse. The rash is characterized by itchiness. It is unknown if there was an exposure to a precipitant. Pertinent negatives include no fever, joint pain, shortness of breath or sore throat. Past treatments include antihistamine. The treatment provided mild relief.     Review of Systems   Constitution: Negative for chills and fever.   HENT: Negative for sore throat.    Respiratory: Negative for shortness of breath.    Skin: Positive for rash. Negative for itching.   Musculoskeletal: Negative for joint pain.       Objective:      Physical Exam   Constitutional: She is oriented to person, place, and time. She appears well-developed and well-nourished.   HENT:   Head: Normocephalic and atraumatic. Head is without abrasion, without contusion and without laceration.   Right Ear: External ear normal.   Left Ear: External ear normal.   Nose: Nose normal.   Mouth/Throat: Oropharynx is clear and moist.   Eyes: Conjunctivae, EOM and lids are normal. Pupils are equal, round, and reactive to light.   Neck: Trachea normal, full passive range of motion without pain and phonation normal. Neck supple.   Cardiovascular: Normal rate, regular rhythm and normal heart sounds.    Pulmonary/Chest: Effort normal and breath sounds normal. No stridor. No respiratory distress.   Musculoskeletal: Normal range of motion.   Neurological: She is alert and oriented to person, place, and time.   Skin: Skin is warm, dry and " intact. Capillary refill takes less than 2 seconds. Rash noted. No abrasion, no bruising, no burn, no ecchymosis, no laceration and no lesion noted. Rash is maculopapular. No erythema.        Psychiatric: She has a normal mood and affect. Her speech is normal and behavior is normal. Judgment and thought content normal. Cognition and memory are normal.   Nursing note and vitals reviewed.      Assessment:       1. Dermatitis        Plan:         Dermatitis  -     fexofenadine (ALLEGRA) 180 MG tablet; Take 1 tablet (180 mg total) by mouth once daily. for 14 days  Dispense: 14 tablet; Refill: 0  -     betamethasone acetate-betamethasone sodium phosphate injection 6 mg; Inject 1 mL (6 mg total) into the muscle one time.  -     methylPREDNISolone (MEDROL DOSEPACK) 4 mg tablet; Take 1 tablet (4 mg total) by mouth as needed (Take as directed). Take as directed  Dispense: 1 Package; Refill: 0      Patient Instructions   Please follow up with your Primary care provider within 2-5 days if your signs and symptoms have not resolved or worsen.     If your condition worsens or fails to improve we recommend that you receive another evaluation at the emergency room immediately or contact your primary medical clinic to discuss your concerns.    You must understand that you have received an Urgent Care treatment only and that you may be released before all of your medical problems are known or treated.   You, the patient, will arrange for follow up care as instructed.       Skin Rash    If your condition worsens or fails to improve we recommend that you receive another evaluation at the ER immediately or contact your PCP to discuss your concerns or return here.     If you develop additional symptoms such as tongue, mouth or throat swelling or any  trouble breathing go immediately to the ER or call 911.     You must understand that you've received an urgent care treatment only and that you may be released before all your medical  problems are known or treated. You the patient will arrange for followup care as instructed.     Zyrtec, Claritin, Xyzal or Allegra should be used daily for the next 7-10 days to prevent or suppress the itching. You can take Benedryl 25 mg at bedtime as well.     Steroid Use    If you had a steroid injection in the clinic and you were given an oral steroid today, do not start the oral prescription until tomorrow.     As discussed, steroid use can elevate your blood pressure, elevate your blood sugar, water weight gain, nervous energy, redness to the face and dimpling of the skin at the injection site.     Oral steroids are hard on the stomach and should always be taken with food.      Once starting a steroid pack, you should complete it as directed.  If you abruptly discontinue the steroid, contact your PCP prior to doing this.      Please do not scratch the areas as they can become infected.  If you notice puss, fever, redness, increased warmth or red streaking from the area, please see your PCP or Urgent care as soon as possible.          General Allergic Reactions  An allergic reaction is a set of symptoms caused by an allergen. An allergen is something that causes a persons immune system to react. When a person comes in contact with an allergen, it causes the body to release chemicals. These include the chemical histamine. Histamine causes swelling and itching. It may affect the entire body. This is called a general allergic reaction. Often symptoms affect only 1 part of the body. This is called a local allergic reaction.  You are having an allergic reaction. Almost anything can cause one. Different people are allergic to different things. It is usually something that you ate or swallowed, came into contact with by getting or putting it on your skin or clothes, or something you breathed in the air. This can be very annoying and sometimes scary.  Most of us think of allergic reactions when we have a rash or  itchy skin. Symptoms can include:  · Itching of the eyes, nose, and roof of the mouth  · Runny or stuffy nose  · Watery eyes   · Sneezing or coughing   · A blocked feeling in the ear  · Red, itchy rash called hives  · Red and purple spots  · Rash, redness, welts, blisters  · Itching, burning, stinging, pain  · Dry, flaky, cracking, scaly skin  Severe symptoms include:  · Swelling of the face, lips, or other parts of the body  · Hoarse voice  · Trouble swallowing, feeling like your throat is closing  · Trouble breathing, wheezing  · Nausea, vomiting, diarrhea, stomach cramps  · Feeling faint or lightheaded, rapid heart rate  Sometimes the cause may be obvious. But there are so many things that can cause a reaction that you may not be able to figure out. The most important things to help find your allergen are:  · Remembering when it started  · What you were doing at the time or just before that  · Any activities you were involved in  · Any new products or contacts  Below are some common causes. But remember that almost anything can cause a reaction. You may not even be aware that you came into contact with one of these things:  · Dust, mold, pollen  · Plants (common ones are poison ivy and poison oak, but there are many others)   · Animals  · Foods such as shrimp, shellfish, peanuts, milk products, gluten, and eggs. Also food colorings, flavorings, and additives.  · Insect bites or stings such as bees, mosquitos, fleas, ticks  · Medicines such as penicillin, sulfa medicines, amoxicillin, aspirin, and ibuprofen. But any medicine can cause a reaction.  · Jewelry such as nickel or gold. This can be new, or something youve worn for a while, including zippers and buttons.  · Latex such as in gloves, clothes, toys, balloons, or some tapes. Some people allergic to latex may also have problems with foods like bananas, avocados, kiwi, papaya, or chestnuts.  · Lotions, perfumes, cosmetics, soaps, shampoos, skincare products,  nail products  · Chemicals or dyes in clothing, linen, , hair dyes, soaps, iodine  Many viruses and common colds can cause a rash that is not an allergic reaction. Sometimes it is hard to tell the difference between allergies, sensitivity, or an intolerance to something. This is especially true with food. Many things can cause diarrhea, vomiting, stomach cramps, and skin irritation.  Home care    The goal of treatment is to help relieve the symptoms and get you feeling better. The rash will usually fade over several days. But it can sometimes last a couple of weeks. Over the next couple of days, there may be times when it is gets a little worse, and then better again. Here are some things to do:  · If you know what you are allergic to, stay away from it. Future reactions could be worse than this one.  · Avoid tight clothing and anything that heats up your skin (hot showers or baths, direct sunlight). Heat will make itching worse.  · An ice pack will relieve local areas of intense itching and redness. To make an ice pack, put ice cubes in a plastic bag that seals at the top. Wrap it in a thin, clean towel. Dont put the ice directly on the skin because it can damage the skin.  · Oral diphenhydramine is an over-the-counter antihistamine sold at pharmacy and grocery stores. Unless a prescription antihistamine was given, diphenhydramine may be used to reduce itching if large areas of the skin are involved. It may make you sleepy. So be careful using it in the daytime or when going to school, working, or driving. Note: Dont use diphenhydramine if you have glaucoma or if you are a man with trouble urinating due to an enlarged prostate. There are other antihistamines that wont make you so sleepy. These are good choices for daytime use. Ask your pharmacist for suggestions.  · Dont use diphenhydramine cream on your skin. It can cause a further reaction in some people.  · To help prevent an infection, don't scratch  the affected area. Scratching may worsen the reaction and damage your skin. It can also lead to an infection. Always check the affected for signs of an infection.  · Call your healthcare provider and ask what you can use to help decrease the itching.  · To decrease allergic reactions, try the following:    · Use heat-steam to clean your home  · Use high-efficiency particulate (HEPA) vacuums and filters  · Stay away from food and pet triggers  · Kill any cockroaches  · Clean your house often  Follow-up care  Follow up with your healthcare provider, or as advised. If you had a severe reaction today, or if you have had several mild to medium allergic reactions in the past, ask your provider about allergy testing. This can help you find out what you are allergic to. If your reaction included dizziness, fainting, or trouble breathing or swallowing, ask your provider about carrying auto-injectable epinephrine.  Call 911  Call 911 if any of these occur:  · Trouble breathing or swallowing, wheezing  · Cool, moist, pale skin  · Shortness of breath  · Hoarse voice or trouble speaking  · Confused   · Very drowsy or trouble awakening  · Fainting or loss of consciousness  · Rapid heart rate  · Feeling of dizziness or weakness or a sudden drop in blood pressure  · Feeling of doom  · Feeling lightheaded  · Severe nausea or vomiting, or diarrhea  · Seizure  · Swelling in the face, eyelids, lips, mouth, throat or tongue  · Drooling  When to seek medical advice  Call your healthcare provider right away if any of these occur:  · Spreading areas of itching, redness or swelling  · Nausea or stomach cramps or abdominal pain  · Continuing or recurring symptoms  · Spreading areas of redness, swelling, or itching  · Signs of infection at the affected site:  ¨ Spreading redness  ¨ Increased pain or swelling  ¨ Fluid or colored drainage from the site  ¨ Fever of 100.4°F (38°C) or above lasting for 24 to 48 hours, or as directed by your  provider  Date Last Reviewed: 3/1/2017  © 1231-5280 [employer]. [address] [place] [address], [place] [address]. All rights reserved. This information is not intended as a substitute for professional medical care. Always follow your healthcare professional's instructions.

## 2018-08-02 ENCOUNTER — TELEPHONE (OUTPATIENT)
Dept: URGENT CARE | Facility: CLINIC | Age: 38
End: 2018-08-02

## 2018-08-02 NOTE — TELEPHONE ENCOUNTER
I have attempted to contact this patient by phone with the following results: message left to return my call with her sister for her to call the clinic back.

## 2018-08-18 DIAGNOSIS — L30.9 DERMATITIS: ICD-10-CM

## 2018-08-18 RX ORDER — MINERAL OIL
180 ENEMA (ML) RECTAL DAILY
Qty: 30 TABLET | Refills: 1 | COMMUNITY
Start: 2018-08-18 | End: 2019-03-18 | Stop reason: SDUPTHER

## 2018-08-18 NOTE — TELEPHONE ENCOUNTER
"----- Message from Lou Cordero sent at 2018 10:50 AM CDT -----  Contact: pt [phone]  RX request - refill or new RX.  Is this a refill or new RX:  Refill   RX name and strength: fexofenadine (ALLEGRA) 180 MG tablet ()  Directions:   Is this a 30 day or 90 day RX:    Local pharmacy or mail order pharmacy:  Local   Pharmacy name and phone # (DON'T enter "on file" or "in chart"): [facility] [address] [place] [address] AT [facility] [address] [phone] (Phone)  [phone] (Fax)      Comments:        "

## 2018-08-23 RX ORDER — PHENTERMINE HYDROCHLORIDE 37.5 MG/1
37.5 TABLET ORAL DAILY
Qty: 30 TABLET | Refills: 0 | Status: SHIPPED | OUTPATIENT
Start: 2018-08-23 | End: 2018-10-01 | Stop reason: SDUPTHER

## 2018-08-23 NOTE — TELEPHONE ENCOUNTER
"----- Message from Jessica VERO Fisher sent at 8/23/2018  3:56 PM CDT -----  Contact: self/[phone]  RX request - refill or new RX.  Is this a refill or new RX:  refill  RX name and strength: phentermine (ADIPEX-P) 37.5 mg tablet  Directions: Take 1 tablet (37.5 mg total) by mouth once daily. - Oral  Is this a 30 day or 90 day RX:    Local pharmacy or mail order pharmacy:  Local pharmacy  Pharmacy name and phone # (DON'T enter "on file" or "in chart"): [facility] [address] [place] [address] AT [facility] [address] [phone] (Phone) [phone] (Fax)  Comments:  Thank you        "

## 2018-09-24 RX ORDER — PHENTERMINE HYDROCHLORIDE 37.5 MG/1
37.5 TABLET ORAL DAILY
Qty: 30 TABLET | Refills: 0 | Status: CANCELLED | OUTPATIENT
Start: 2018-09-24

## 2018-09-24 NOTE — TELEPHONE ENCOUNTER
----- Message from Luis Parsons sent at 9/24/2018  2:46 PM CDT -----  Contact: Patient [phone]  Is this a refill or new RX:  Refill    RX name and strength: phentermine (ADIPEX-P) 37.5 mg tablet    Pharmacy name and phone # [facility] [address] [place] [address] AT [facility] [address] [phone] (Phone)  [phone] (Fax)

## 2018-09-26 RX ORDER — PHENTERMINE HYDROCHLORIDE 37.5 MG/1
37.5 TABLET ORAL DAILY
Qty: 30 TABLET | Refills: 0 | Status: CANCELLED | OUTPATIENT
Start: 2018-09-26

## 2018-09-26 NOTE — TELEPHONE ENCOUNTER
"----- Message from Natacha Irizarry sent at 9/26/2018 11:15 AM CDT -----  Contact: Betty Hernandez   RX request - refill or new RX.  Is this a refill or new RX:  Refill   RX name and strength: phentermine (ADIPEX-P) 37.5 mg tablet  Directions:   Is this a 30 day or 90 day RX:    Local pharmacy or mail order pharmacy:  Local Pharmacy   Pharmacy name and phone # (DON'T enter "on file" or "in chart"): [facility] [address] [place] [address] AT [facility] [address] [phone] (Phone)  [phone] (Fax)      Comments:          "

## 2018-10-01 RX ORDER — PHENTERMINE HYDROCHLORIDE 37.5 MG/1
37.5 TABLET ORAL DAILY
Qty: 30 TABLET | Refills: 0 | Status: SHIPPED | OUTPATIENT
Start: 2018-10-01 | End: 2018-11-05 | Stop reason: SDUPTHER

## 2018-10-01 NOTE — TELEPHONE ENCOUNTER
----- Message from Ashlee Gorman sent at 9/29/2018 10:25 AM CDT -----  Contact: self/[phone]  Pt called in regards to checking the status of her refill request for phentermine (ADIPEX-P) 37.5 mg tablet 30 tablet 0 8/23/2018 No  Take 1 tablet (37.5 mg total) by mouth once daily.    [facility]'s pharmacy [phone]  Please advise

## 2018-11-05 RX ORDER — PHENTERMINE HYDROCHLORIDE 37.5 MG/1
37.5 TABLET ORAL DAILY
Qty: 30 TABLET | Refills: 0 | Status: SHIPPED | OUTPATIENT
Start: 2018-11-05 | End: 2018-12-03 | Stop reason: SDUPTHER

## 2018-11-05 NOTE — TELEPHONE ENCOUNTER
----- Message from Ashlee Wakefield sent at 11/5/2018 12:10 PM CST -----  Contact: patient [phone]  Pt called to verify that you received a message fron Saturday requesting a refill of phentermine (ADIPEX-P) 37.5 mg tablet.    [facility]  [facility] AT [facility] [address] [facility] [phone]

## 2018-11-05 NOTE — TELEPHONE ENCOUNTER
----- Message from Anita Art sent at 11/3/2018 11:10 AM CDT -----  Contact: Pt Mobile/Home [phone]   RX request - refill or new RX.  Is this a refill or new RX:  Refill  RX name and strength: phentermine (ADIPEX-P) 37.5 mg tablet  Directions:   Is this a 30 day or 90 day RX: 30   Local pharmacy or mail order pharmacy:  [facility] [address] [place] [address] [address]  Pharmacy name and phone #[facility] Phone# [phone],Fax# [phone]

## 2018-12-03 RX ORDER — PHENTERMINE HYDROCHLORIDE 37.5 MG/1
37.5 TABLET ORAL DAILY
Qty: 30 TABLET | Refills: 0 | Status: SHIPPED | OUTPATIENT
Start: 2018-12-04 | End: 2019-01-09 | Stop reason: SDUPTHER

## 2018-12-03 NOTE — TELEPHONE ENCOUNTER
----- Message from Luis Parsons sent at 12/3/2018  3:17 PM CST -----  Contact: Patient [phone]  Is this a refill or new RX:  Refill    RX name and strength: phentermine (ADIPEX-P) 37.5 mg tablet     Pharmacy name and phone # [facility] [address] [place] [address] AT [facility] [address] [phone] (Phone)  [phone] (Fax)

## 2019-01-07 RX ORDER — PHENTERMINE HYDROCHLORIDE 37.5 MG/1
37.5 TABLET ORAL DAILY
Qty: 30 TABLET | Refills: 0 | Status: CANCELLED | OUTPATIENT
Start: 2019-01-07

## 2019-01-07 NOTE — TELEPHONE ENCOUNTER
"----- Message from Miley Neves sent at 1/7/2019  3:39 PM CST -----  Contact: [phone]  RX request - refill or new RX.  Is this a refill or new RX:  Refill  RX name and strength: phentermine (ADIPEX-P) 37.5 mg tablet  Directions:   Is this a 30 day or 90 day RX:    Local pharmacy or mail order pharmacy:  Local  Pharmacy name and phone # (DON'T enter "on file" or "in chart"): [facility] [address] [place] [address] AT [facility] [address] [phone] (Phone)  [phone] (Fax)  Comments:        "

## 2019-01-09 RX ORDER — PHENTERMINE HYDROCHLORIDE 37.5 MG/1
37.5 TABLET ORAL DAILY
Qty: 30 TABLET | Refills: 0 | Status: SHIPPED | OUTPATIENT
Start: 2019-01-09 | End: 2019-02-15 | Stop reason: SDUPTHER

## 2019-01-10 NOTE — TELEPHONE ENCOUNTER
Call patient     I refilled in sent in medication Adipex but she needs to come in for routine check in about a month's time for further refills         this been a while since she has been seen

## 2019-01-16 ENCOUNTER — OFFICE VISIT (OUTPATIENT)
Dept: URGENT CARE | Facility: CLINIC | Age: 39
End: 2019-01-16
Payer: COMMERCIAL

## 2019-01-16 VITALS
OXYGEN SATURATION: 98 % | WEIGHT: 152 LBS | RESPIRATION RATE: 16 BRPM | DIASTOLIC BLOOD PRESSURE: 88 MMHG | SYSTOLIC BLOOD PRESSURE: 132 MMHG | BODY MASS INDEX: 25.33 KG/M2 | HEART RATE: 99 BPM | HEIGHT: 65 IN | TEMPERATURE: 98 F

## 2019-01-16 DIAGNOSIS — S39.012A BACK STRAIN, INITIAL ENCOUNTER: Primary | ICD-10-CM

## 2019-01-16 PROCEDURE — 99214 PR OFFICE/OUTPT VISIT, EST, LEVL IV, 30-39 MIN: ICD-10-PCS | Mod: S$GLB,,, | Performed by: NURSE PRACTITIONER

## 2019-01-16 PROCEDURE — 3008F BODY MASS INDEX DOCD: CPT | Mod: CPTII,S$GLB,, | Performed by: NURSE PRACTITIONER

## 2019-01-16 PROCEDURE — 99214 OFFICE O/P EST MOD 30 MIN: CPT | Mod: S$GLB,,, | Performed by: NURSE PRACTITIONER

## 2019-01-16 PROCEDURE — 3008F PR BODY MASS INDEX (BMI) DOCUMENTED: ICD-10-PCS | Mod: CPTII,S$GLB,, | Performed by: NURSE PRACTITIONER

## 2019-01-16 NOTE — PATIENT INSTRUCTIONS
Please follow up with your Primary care provider within 2-5 days if your signs and symptoms have not resolved or worsen.     If your condition worsens or fails to improve we recommend that you receive another evaluation at the emergency room immediately or contact your primary medical clinic to discuss your concerns.    You must understand that you have received an Urgent Care treatment only and that you may be released before all of your medical problems are known or treated.   You, the patient, will arrange for follow up care as instructed.     ORTHO    Ice 20 minutes on and 20 minutes off PRN     Please drink plenty of fluids.    Please get plenty of rest.    Please return here or go to the Emergency Department for any concerns or worsening of condition.    If you were prescribed a narcotic medication, do not drive or operate heavy equipment or machinery while taking these medications.    If you were not prescribed an anti-inflammatory medication, and if you do not have any history of stomach/intestinal ulcers, or kidney disease, or are not taking a blood thinner such as Coumadin, Plavix, Pradaxa, Eloquis, or Xaralta for example, it is OK to take over the counter Ibuprofen or Advil or Motrin or Aleve as directed.  Do not take these medications on an empty stomach.    If you were given a splint wear it at all times unless otherwise instructed.     If you were given crutches use them as we instructed. Do not rest your armpits on the foam pad or you risk compressing the nerves and the vessels there.      Rest, ice, compression and elevation to the affected joint or limb as needed.    Please follow up with your primary care doctor or specialist as needed.    If you lose control of your bowel and/or bladder, please go to the nearest Emergency Department immediately.    If you lose sensation in between your legs by your genitalia and/or rectum, please go to the nearest Emergency Department immediately.    If you lose  control or sensation of any extremity, please go to the nearest Emergency Department immediately.      Back Sprain or Strain    Injury to the muscles (strain) or ligaments (sprain) around the spine can be troubling. Injury may occur after a sudden forceful twisting or bending force such as in a car accident, after a simple awkward movement, or after lifting something heavy with poor body positioning. In any case, muscle spasm is often present and adds to the pain.  Thankfully, most people feel better in 1 to 2 weeks, and most of the rest in 1 to 2 months. Most people can remain active. Unless you had a forceful or traumatic physical injury such as a car accident or fall, X-rays may not be ordered for the first evaluation of a back sprain or strain. If pain continues and does not respond to medical treatment, your healthcare provider may then order X-rays and other tests.  Home care  The following guidelines will help you care for your injury at home:  · When in bed, try to find a comfortable position. A firm mattress is best. Try lying flat on your back with pillows under your knees. You can also try lying on your side with your knees bent up toward your chest and a pillow between your knees.  · Don't sit for long periods. Try not to take long car rides or take other trips that have you sitting for a long time. This puts more stress on the lower back than standing or walking.  · During the first 24 to 72 hours after an injury or flare-up, apply an ice pack to the painful area for 20 minutes. Then remove it for 20 minutes. Do this for 60 to 90 minutes, or several times a day. This will reduce swelling and pain. Be sure to wrap the ice pack in a thin towel or plastic to protect your skin.  · You can start with ice, then switch to heat. Heat from a hot shower, hot bath, or heating pad reduces pain and works well for muscle spasms. Put heat on the painful area for 20 minutes, then remove for 20 minutes. Do this for  60 to 90 minutes, or several times a day. Do not use a heating pad while sleeping. It can burn the skin.  · You can alternate the ice and heat. Talk with your healthcare provider to find out the best treatment or therapy for your back pain.  · Therapeutic massage will help relax the back muscles without stretching them.  · Be aware of safe lifting methods. Do not lift anything over 15 pounds until all of the pain is gone.  Medicines  Talk to your healthcare provider before using medicines, especially if you have other health problems or are taking other medicines.  · You may use acetaminophen or ibuprofen to control pain, unless another pain medicine was prescribed. If you have chronic conditions like diabetes, liver or kidney disease, stomach ulcers, or gastrointestinal bleeding, or are taking blood-thinner medicines, talk with your doctor before taking any medicines.  · Be careful if you are given prescription medicines, narcotics, or medicine for muscle spasm. They can cause drowsiness, and affect your coordination, reflexes, and judgment. Do not drive or operate heavy machinery when taking these types of medicines. Only take pain medicine as prescribed by your healthcare provider.  Follow-up care  Follow up with your healthcare provider, or as advised. You may need physical therapy or more tests if your symptoms get worse.  If you had X-rays your healthcare provider may be checking for any broken bones, breaks, or fractures. Bruises and sprains can sometimes hurt as much as a fracture. These injuries can take time to heal completely. If your symptoms dont improve or they get worse, talk with your healthcare provider. You may need a repeat X-ray or other tests.  Call 911  Call for emergency care if any of the following occur:  · Trouble breathing  · Confused  · Very drowsy or trouble awakening  · Fainting or loss of consciousness  · Rapid or very slow heart rate  · Loss of bowel or bladder control  When to seek  medical advice  Call your healthcare provider right away if any of the following occur:  · Pain gets worse or spreads to your arms or legs  · Weakness or numbness in one or both arms or legs  · Numbness in the groin or genital area  Date Last Reviewed: 6/1/2016 © 2000-2017 [employer]. [address] [place] [address]. All rights reserved. This information is not intended as a substitute for professional medical care. Always follow your healthcare professional's instructions.

## 2019-01-16 NOTE — LETTER
January 16, 2019      [facility]  [address] [address], [address]  [place] [address]  Phone: [phone]  Fax: [phone]       Patient: Betty Bill   YOB: 1980  Date of Visit: 01/16/2019    To Whom It May Concern:    Kia Bill  was at [facility] on 01/16/2019. She may return to work/school on 1/21/2018 with no restrictions. If you have any questions or concerns, or if I can be of further assistance, please do not hesitate to contact me.    Sincerely,      Nahomi Esparza, NP

## 2019-01-16 NOTE — PROGRESS NOTES
"Subjective:       Patient ID: Betty Bill is a 38 y.o. female.    Vitals:  height is 5' 5" (1.651 m) and weight is 68.9 kg (152 lb). Her oral temperature is 97.5 °F (36.4 °C). Her blood pressure is 132/88 and her pulse is 99. Her respiration is 16 and oxygen saturation is 98%.     Chief Complaint: Back Pain    Patient was seen [facility] for back yesterday. No injury.      Back Pain   This is a new problem. Episode onset: 2 days ago. The problem occurs constantly. The problem is unchanged. The pain is present in the lumbar spine. The pain radiates to the left thigh. The pain is at a severity of 8/10. The pain is moderate. The pain is the same all the time. The symptoms are aggravated by bending and lying down. Stiffness is present all day. Pertinent negatives include no abdominal pain, chest pain, dysuria, fever, headaches, leg pain, numbness, paresis, paresthesias, pelvic pain, perianal numbness, tingling, weakness or weight loss. She has tried muscle relaxant for the symptoms. The treatment provided no relief.       Constitution: Negative for fever.   Cardiovascular: Negative for chest pain.   Gastrointestinal: Negative for abdominal pain.   Genitourinary: Negative for dysuria and pelvic pain.   Musculoskeletal: Positive for back pain.   Neurological: Negative for headaches and numbness.       Objective:      Physical Exam   Constitutional: She is oriented to person, place, and time. Vital signs are normal. She appears well-developed and well-nourished. She is active and cooperative. No distress.   HENT:   Head: Normocephalic and atraumatic.   Nose: Nose normal.   Mouth/Throat: Oropharynx is clear and moist and mucous membranes are normal.   Eyes: Conjunctivae and lids are normal.   Neck: Trachea normal, normal range of motion, full passive range of motion without pain and phonation normal. Neck supple.   Cardiovascular: Normal rate, regular rhythm, normal heart sounds, intact distal pulses and normal " pulses.   Pulmonary/Chest: Effort normal and breath sounds normal.   Abdominal: Soft. Normal appearance and bowel sounds are normal. She exhibits no abdominal bruit, no pulsatile midline mass and no mass.   Musculoskeletal: She exhibits no edema or deformity.        Lumbar back: She exhibits decreased range of motion, tenderness, pain and spasm. She exhibits no bony tenderness, no swelling, no edema, no deformity, no laceration and normal pulse.   Neurological: She is alert and oriented to person, place, and time. She has normal strength and normal reflexes. No sensory deficit.   Skin: Skin is warm, dry and intact. She is not diaphoretic.   Psychiatric: She has a normal mood and affect. Her speech is normal and behavior is normal. Judgment and thought content normal. Cognition and memory are normal.   Nursing note and vitals reviewed.      Assessment:       1. Back strain, initial encounter        Plan:       Continue with medication prescribed by the ER yesterday.  Follow up with PCP.  Excuse for work given in order for patient to rest.    Back strain, initial encounter      Patient Instructions   Please follow up with your Primary care provider within 2-5 days if your signs and symptoms have not resolved or worsen.     If your condition worsens or fails to improve we recommend that you receive another evaluation at the emergency room immediately or contact your primary medical clinic to discuss your concerns.    You must understand that you have received an Urgent Care treatment only and that you may be released before all of your medical problems are known or treated.   You, the patient, will arrange for follow up care as instructed.     ORTHO    Ice 20 minutes on and 20 minutes off PRN     Please drink plenty of fluids.    Please get plenty of rest.    Please return here or go to the Emergency Department for any concerns or worsening of condition.    If you were prescribed a narcotic medication, do not drive or  operate heavy equipment or machinery while taking these medications.    If you were not prescribed an anti-inflammatory medication, and if you do not have any history of stomach/intestinal ulcers, or kidney disease, or are not taking a blood thinner such as Coumadin, Plavix, Pradaxa, Eloquis, or Xaralta for example, it is OK to take over the counter Ibuprofen or Advil or Motrin or Aleve as directed.  Do not take these medications on an empty stomach.    If you were given a splint wear it at all times unless otherwise instructed.     If you were given crutches use them as we instructed. Do not rest your armpits on the foam pad or you risk compressing the nerves and the vessels there.      Rest, ice, compression and elevation to the affected joint or limb as needed.    Please follow up with your primary care doctor or specialist as needed.    If you lose control of your bowel and/or bladder, please go to the nearest Emergency Department immediately.    If you lose sensation in between your legs by your genitalia and/or rectum, please go to the nearest Emergency Department immediately.    If you lose control or sensation of any extremity, please go to the nearest Emergency Department immediately.      Back Sprain or Strain    Injury to the muscles (strain) or ligaments (sprain) around the spine can be troubling. Injury may occur after a sudden forceful twisting or bending force such as in a car accident, after a simple awkward movement, or after lifting something heavy with poor body positioning. In any case, muscle spasm is often present and adds to the pain.  Thankfully, most people feel better in 1 to 2 weeks, and most of the rest in 1 to 2 months. Most people can remain active. Unless you had a forceful or traumatic physical injury such as a car accident or fall, X-rays may not be ordered for the first evaluation of a back sprain or strain. If pain continues and does not respond to medical treatment, your  healthcare provider may then order X-rays and other tests.  Home care  The following guidelines will help you care for your injury at home:  · When in bed, try to find a comfortable position. A firm mattress is best. Try lying flat on your back with pillows under your knees. You can also try lying on your side with your knees bent up toward your chest and a pillow between your knees.  · Don't sit for long periods. Try not to take long car rides or take other trips that have you sitting for a long time. This puts more stress on the lower back than standing or walking.  · During the first 24 to 72 hours after an injury or flare-up, apply an ice pack to the painful area for 20 minutes. Then remove it for 20 minutes. Do this for 60 to 90 minutes, or several times a day. This will reduce swelling and pain. Be sure to wrap the ice pack in a thin towel or plastic to protect your skin.  · You can start with ice, then switch to heat. Heat from a hot shower, hot bath, or heating pad reduces pain and works well for muscle spasms. Put heat on the painful area for 20 minutes, then remove for 20 minutes. Do this for 60 to 90 minutes, or several times a day. Do not use a heating pad while sleeping. It can burn the skin.  · You can alternate the ice and heat. Talk with your healthcare provider to find out the best treatment or therapy for your back pain.  · Therapeutic massage will help relax the back muscles without stretching them.  · Be aware of safe lifting methods. Do not lift anything over 15 pounds until all of the pain is gone.  Medicines  Talk to your healthcare provider before using medicines, especially if you have other health problems or are taking other medicines.  · You may use acetaminophen or ibuprofen to control pain, unless another pain medicine was prescribed. If you have chronic conditions like diabetes, liver or kidney disease, stomach ulcers, or gastrointestinal bleeding, or are taking blood-thinner  medicines, talk with your doctor before taking any medicines.  · Be careful if you are given prescription medicines, narcotics, or medicine for muscle spasm. They can cause drowsiness, and affect your coordination, reflexes, and judgment. Do not drive or operate heavy machinery when taking these types of medicines. Only take pain medicine as prescribed by your healthcare provider.  Follow-up care  Follow up with your healthcare provider, or as advised. You may need physical therapy or more tests if your symptoms get worse.  If you had X-rays your healthcare provider may be checking for any broken bones, breaks, or fractures. Bruises and sprains can sometimes hurt as much as a fracture. These injuries can take time to heal completely. If your symptoms dont improve or they get worse, talk with your healthcare provider. You may need a repeat X-ray or other tests.  Call 911  Call for emergency care if any of the following occur:  · Trouble breathing  · Confused  · Very drowsy or trouble awakening  · Fainting or loss of consciousness  · Rapid or very slow heart rate  · Loss of bowel or bladder control  When to seek medical advice  Call your healthcare provider right away if any of the following occur:  · Pain gets worse or spreads to your arms or legs  · Weakness or numbness in one or both arms or legs  · Numbness in the groin or genital area  Date Last Reviewed: 6/1/2016  © 9522-6963 [employer]. [address] [place] [address], [place] [address]. All rights reserved. This information is not intended as a substitute for professional medical care. Always follow your healthcare professional's instructions.

## 2019-01-19 ENCOUNTER — TELEPHONE (OUTPATIENT)
Dept: URGENT CARE | Facility: CLINIC | Age: 39
End: 2019-01-19

## 2019-01-21 ENCOUNTER — TELEPHONE (OUTPATIENT)
Dept: INTERNAL MEDICINE | Facility: CLINIC | Age: 39
End: 2019-01-21

## 2019-01-30 ENCOUNTER — HOSPITAL ENCOUNTER (OUTPATIENT)
Dept: RADIOLOGY | Facility: HOSPITAL | Age: 39
Discharge: HOME | End: 2019-01-30
Attending: INTERNAL MEDICINE
Payer: COMMERCIAL

## 2019-01-30 ENCOUNTER — OFFICE VISIT (OUTPATIENT)
Dept: INTERNAL MEDICINE | Facility: CLINIC | Age: 39
End: 2019-01-30
Payer: COMMERCIAL

## 2019-01-30 VITALS
HEIGHT: 65 IN | DIASTOLIC BLOOD PRESSURE: 87 MMHG | WEIGHT: 152 LBS | OXYGEN SATURATION: 99 % | HEART RATE: 84 BPM | BODY MASS INDEX: 25.33 KG/M2 | SYSTOLIC BLOOD PRESSURE: 136 MMHG

## 2019-01-30 DIAGNOSIS — M54.16 LUMBAR RADICULOPATHY: ICD-10-CM

## 2019-01-30 DIAGNOSIS — M54.50 CHRONIC BILATERAL LOW BACK PAIN WITHOUT SCIATICA: Primary | ICD-10-CM

## 2019-01-30 DIAGNOSIS — G89.29 CHRONIC BILATERAL LOW BACK PAIN WITHOUT SCIATICA: ICD-10-CM

## 2019-01-30 DIAGNOSIS — G89.29 CHRONIC BILATERAL LOW BACK PAIN WITHOUT SCIATICA: Primary | ICD-10-CM

## 2019-01-30 DIAGNOSIS — M54.50 CHRONIC BILATERAL LOW BACK PAIN WITHOUT SCIATICA: ICD-10-CM

## 2019-01-30 PROCEDURE — 99999 PR PBB SHADOW E&M-EST. PATIENT-LVL III: CPT | Mod: PBBFAC,,, | Performed by: INTERNAL MEDICINE

## 2019-01-30 PROCEDURE — 72100 X-RAY EXAM L-S SPINE 2/3 VWS: CPT | Mod: TC,FY,PO

## 2019-01-30 PROCEDURE — 99214 PR OFFICE/OUTPT VISIT, EST, LEVL IV, 30-39 MIN: ICD-10-PCS | Mod: S$GLB,,, | Performed by: INTERNAL MEDICINE

## 2019-01-30 PROCEDURE — 72100 XR LUMBAR SPINE AP AND LATERAL: ICD-10-PCS | Mod: 26,,, | Performed by: RADIOLOGY

## 2019-01-30 PROCEDURE — 3008F BODY MASS INDEX DOCD: CPT | Mod: CPTII,S$GLB,, | Performed by: INTERNAL MEDICINE

## 2019-01-30 PROCEDURE — 99214 OFFICE O/P EST MOD 30 MIN: CPT | Mod: S$GLB,,, | Performed by: INTERNAL MEDICINE

## 2019-01-30 PROCEDURE — 99999 PR PBB SHADOW E&M-EST. PATIENT-LVL III: ICD-10-PCS | Mod: PBBFAC,,, | Performed by: INTERNAL MEDICINE

## 2019-01-30 PROCEDURE — 72100 X-RAY EXAM L-S SPINE 2/3 VWS: CPT | Mod: 26,,, | Performed by: RADIOLOGY

## 2019-01-30 PROCEDURE — 3008F PR BODY MASS INDEX (BMI) DOCUMENTED: ICD-10-PCS | Mod: CPTII,S$GLB,, | Performed by: INTERNAL MEDICINE

## 2019-01-30 RX ORDER — DICLOFENAC SODIUM 75 MG/1
TABLET, DELAYED RELEASE ORAL
Qty: 180 TABLET | Refills: 0 | OUTPATIENT
Start: 2019-01-30

## 2019-01-30 RX ORDER — PREDNISONE 20 MG/1
TABLET ORAL
Qty: 12 TABLET | Refills: 0 | Status: SHIPPED | OUTPATIENT
Start: 2019-01-30 | End: 2020-08-24

## 2019-01-30 RX ORDER — DICLOFENAC SODIUM 75 MG/1
75 TABLET, DELAYED RELEASE ORAL 2 TIMES DAILY
Qty: 20 TABLET | Refills: 0 | Status: SHIPPED | OUTPATIENT
Start: 2019-01-30 | End: 2019-02-09

## 2019-01-30 RX ORDER — METHOCARBAMOL 500 MG/1
500 TABLET, FILM COATED ORAL 4 TIMES DAILY
Qty: 40 TABLET | Refills: 0 | Status: SHIPPED | OUTPATIENT
Start: 2019-01-30 | End: 2019-02-09

## 2019-01-30 NOTE — PROGRESS NOTES
Lumbar spine film was fine other than slight degenerative changes at the L4 S1 disc    No change in management    Continue with current medications prescribed    If no improvement referred to physical therapy

## 2019-01-30 NOTE — PROGRESS NOTES
REASON FOR VISIT:  This is a 38-year-old female who for probably a few weeks has   been having ongoing persistent lumbar pain that has been persistent.  At times,   she might have an ache feeling going down the back of her hamstring and   numbness in the buttocks.  She actually went to the Emergency Room on 01/15/2019   and an Urgent Care on 01/16/2019.  Urinalysis was unrevealing.  She was given   Flexeril, which made her sleepy and ibuprofen 600, which she did not think has   been effective.  Pain seems to be worse when bending over or doing any prolonged   activity.  There is no associated abdominal pain.  Bowel function is fine.  No   difficulty urinating.    She does a lot of lifting at her job.  The only time she may have had a back   injury is when falling down on her tailbone.    PAST MEDICAL HISTORY:  Attention deficit.  Depression.  Anxiety.  She has a degree of deafness.    CURRENT MEDICATIONS:  Phentermine 37.  Allegra as needed.    PHYSICAL EXAMINATION:  VITAL SIGNS:  Weight 152 pounds, blood pressure 130/82.  ABDOMEN:  Active bowel sounds, soft, nontender.  The patient is not tender when   I palpate over the lower back.  2+ knee and ankle jerk reflex.  Her motor   strength of the legs is normal.  With straight leg raise, she is having low back   pain.    IMPRESSION:  1.  Chronic lumbar strain.  2.  Lumbar radiculopathy.    PLAN:  Diclofenac 75 mg twice a day for two weeks, prednisone taper over eight   days, methocarbamol 500 mg three times a day. also for 10 days.  She has lumbar   spine film.  She may need to have physical therapy.  Stretching exercise of the   legs discussed.        /willian [id number] review        JOSTIN/CROW  dd: 01/30/2019 14:27:42 (CST)  td: 01/31/2019 01:07:34 (CST)  Doc ID   #[id number]  Job ID #[id number]    CC:

## 2019-02-13 RX ORDER — PHENTERMINE HYDROCHLORIDE 37.5 MG/1
37.5 TABLET ORAL DAILY
Qty: 30 TABLET | Refills: 0 | Status: CANCELLED | OUTPATIENT
Start: 2019-02-13

## 2019-02-13 NOTE — TELEPHONE ENCOUNTER
----- Message from Alise Cadet sent at 2/13/2019  3:04 PM CST -----  Contact: [phone]  Type: Rx    Name of medication(s):  phentermine (ADIPEX-P) 37.5 mg tablet     Is this a refill? New rx?refill      Who prescribed medication?    Pharmacy Name, Phone, & Location: [facility] [address] [place] [address] AT [facility] OF [address] & [facility] [address] [phone] (Phone)     Comments: please call and advise, Thanks

## 2019-02-15 RX ORDER — PHENTERMINE HYDROCHLORIDE 37.5 MG/1
37.5 TABLET ORAL DAILY
Qty: 30 TABLET | Refills: 0 | Status: SHIPPED | OUTPATIENT
Start: 2019-02-15 | End: 2019-03-19 | Stop reason: SDUPTHER

## 2019-02-15 NOTE — TELEPHONE ENCOUNTER
----- Message from Anita Art sent at 2/15/2019  2:52 PM CST -----  Contact: Pt Mobile/Home [phone]   RX request - refill or new RX.  Is this a refill or new RX:  Refill  RX name and strength: phentermine (ADIPEX-P) 37.5 mg tablet  Directions:   Is this a 30 day or 90 day RX:  30  Local pharmacy or mail order pharmacy:  [facility] [address] [place] [address] [address]  Pharmacy name and phone # [facility] Phone# [phone],Fax# [phone]

## 2019-03-18 DIAGNOSIS — L30.9 DERMATITIS: ICD-10-CM

## 2019-03-18 RX ORDER — MINERAL OIL
180 ENEMA (ML) RECTAL DAILY
Qty: 30 TABLET | Refills: 1 | COMMUNITY
Start: 2019-03-18 | End: 2021-10-20

## 2019-03-18 NOTE — TELEPHONE ENCOUNTER
----- Message from Yaima Parker sent at 3/16/2019  8:54 AM CDT -----  Contact: Patient [phone]    Prescription Request:     Name of medication: fexofenadine (ALLEGRA) 180 MG tablet    Reason for request: Refill    Pharmacy: [facility] [address] [place] [address] AT [facility] OF [address]    Please advise.    Thank You

## 2019-03-19 RX ORDER — PHENTERMINE HYDROCHLORIDE 37.5 MG/1
37.5 TABLET ORAL DAILY
Qty: 30 TABLET | Refills: 0 | Status: SHIPPED | OUTPATIENT
Start: 2019-03-19 | End: 2019-04-23 | Stop reason: SDUPTHER

## 2019-03-19 NOTE — TELEPHONE ENCOUNTER
----- Message from Luisana Reece sent at 3/19/2019 11:49 AM CDT -----  Contact: [phone]  Type: Rx    Name of medication(s): phentermine (ADIPEX-P) 37.5 mg tablet      Is this a refill? New rx?  refill    Who prescribed medication?  [facility]    Pharmacy Name, Phone, & Location:  [facility] [address] [place] [address] AT [facility] OF [address]    Comments:   Please advise, thank you

## 2019-04-10 ENCOUNTER — PATIENT OUTREACH (OUTPATIENT)
Dept: ADMINISTRATIVE | Facility: HOSPITAL | Age: 39
End: 2019-04-10

## 2019-04-10 NOTE — PROGRESS NOTES
[facility] message sent to pt about cervical cancer screening. Chart review also completed. Links edited, pt has no vaccines listed. HM modifiers updated, care team updated, old orders deleted.

## 2019-04-17 RX ORDER — PHENTERMINE HYDROCHLORIDE 37.5 MG/1
37.5 TABLET ORAL DAILY
Qty: 30 TABLET | Refills: 0 | Status: CANCELLED | OUTPATIENT
Start: 2019-04-17

## 2019-04-17 NOTE — TELEPHONE ENCOUNTER
----- Message from Esha Martinez sent at 4/17/2019  9:28 AM CDT -----  Contact: Patient [phone]  Patient is calling for an RX refill or new RX.  Is this a refill or new RX:  refill  RX name and strength: phentermine (ADIPEX-P) 37.5 mg tablet  Directions (copy/paste from chart):   Take 1 tablet (37.5 mg total) by mouth once daily. - Oral  Is this a 30 day or 90 day RX:  30  Local pharmacy or mail order pharmacy:  local  Pharmacy name and phone # ([facility] [address] [place], [place] [address] [address] AT [facility] OF [address] [phone] (Phone)  [phone] (Fax)    Comments:    Please call and advise  Thank you

## 2019-04-23 NOTE — TELEPHONE ENCOUNTER
----- Message from Sarah Champagne sent at 4/23/2019  2:39 PM CDT -----  Contact: [phone]  Patient is calling for an RX refill or new RX.  Is this a refill or new RX:  Refill  RX name and strength: Phentermine (ADIPEX-P) 37.5 mg tablet  Directions (copy/paste from chart):   Take 1 tablet (37.5 mg total) by mouth once daily. - Oral  Is this a 30 day or 90 day RX:  30  Local pharmacy or mail order pharmacy:  Local  Pharmacy name and phone # (copy/paste from chart):   [facility] [phone] (Phone)  [phone] (Fax)  Comments:

## 2019-04-24 RX ORDER — PHENTERMINE HYDROCHLORIDE 37.5 MG/1
37.5 TABLET ORAL DAILY
Qty: 30 TABLET | Refills: 0 | Status: SHIPPED | OUTPATIENT
Start: 2019-04-24 | End: 2019-05-22 | Stop reason: SDUPTHER

## 2019-05-20 RX ORDER — PHENTERMINE HYDROCHLORIDE 37.5 MG/1
37.5 TABLET ORAL DAILY
Qty: 30 TABLET | Refills: 0 | Status: CANCELLED | OUTPATIENT
Start: 2019-05-20

## 2019-05-20 NOTE — TELEPHONE ENCOUNTER
----- Message from Esha Martinez sent at 5/20/2019  2:57 PM CDT -----  Contact: Patient [phone]  Patient is calling for an RX refill or new RX.  Is this a refill or new RX:  refill  RX name and strength: phentermine (ADIPEX-P) 37.5 mg tablet  Directions (copy/paste from chart):   Take 1 tablet (37.5 mg total) by mouth once daily  Is this a 30 day or 90 day RX:  30  Local pharmacy or mail order pharmacy:  Local  Pharmacy name and phone # (copy/paste from chart):[facility] [address] [place] [address] AT [facility] OF [address] [phone] (Phone) [phone] (Fax)       Comments:    Please call and advise  Thank you

## 2019-05-22 RX ORDER — PHENTERMINE HYDROCHLORIDE 37.5 MG/1
37.5 TABLET ORAL DAILY
Qty: 30 TABLET | Refills: 0 | Status: SHIPPED | OUTPATIENT
Start: 2019-05-22 | End: 2019-06-28 | Stop reason: SDUPTHER

## 2019-05-22 NOTE — TELEPHONE ENCOUNTER
----- Message from Esha Martinez sent at 5/22/2019  3:12 PM CDT -----  Contact: Patient [phone]  Patient is calling for an RX refill or new RX.  Is this a refill or new RX:  refill  RX name and strength: phentermine (ADIPEX-P) 37.5 mg tablet  Directions (copy/paste from chart):  Take 1 tablet (37.5 mg total) by mouth once daily. - Oral  Is this a 30 day or 90 day RX:  30  Local pharmacy or mail order pharmacy:  local  Pharmacy name and phone # [facility] [address] [place] [address] AT [facility] OF [address] [phone] (Phone)  [phone] (Fax)    Comments:    Please call and advise  Thank you

## 2019-06-21 RX ORDER — PHENTERMINE HYDROCHLORIDE 37.5 MG/1
37.5 TABLET ORAL DAILY
Qty: 30 TABLET | Refills: 0 | Status: CANCELLED | OUTPATIENT
Start: 2019-06-21

## 2019-06-21 NOTE — TELEPHONE ENCOUNTER
----- Message from Alexus Cr sent at 6/21/2019  9:42 AM CDT -----  Contact: Patient [phone]  Type: Rx    Name of medication(s):  phentermine (ADIPEX-P) 37.5 mg tablet    Is this a refill? New rx?Refill    Who prescribed medication?Stewart Hurd MD    Pharmacy Name, Phone, & Location:[facility] [address] [place] [address] AT [facility] OF [address]    Comments:Please refill.      Thanks

## 2019-06-25 RX ORDER — PHENTERMINE HYDROCHLORIDE 37.5 MG/1
37.5 TABLET ORAL DAILY
Qty: 30 TABLET | Refills: 0 | Status: CANCELLED | OUTPATIENT
Start: 2019-06-25

## 2019-06-25 NOTE — TELEPHONE ENCOUNTER
----- Message from Jimena Kelley sent at 6/25/2019  9:15 AM CDT -----  Contact: self   Patient is calling for an RX refill or new RX.  Is this a refill or new RX:  refillphentermine (ADIPEX-P) 37.5 mg tablet  RX name and strength: Take 1 tablet (37.5 mg total) by mouth once daily  Directions (copy/paste from chart):    Is this a 30 day or 90 day RX:  30  Local pharmacy or mail order pharmacy:  local  Pharmacy name and phone # (copy/paste from chart):   [facility] [address] [place] [address] AT [facility] OF [address] [phone] (Phone)  [phone] (Fax)  Comments:

## 2019-06-28 RX ORDER — PHENTERMINE HYDROCHLORIDE 37.5 MG/1
37.5 TABLET ORAL DAILY
Qty: 30 TABLET | Refills: 0 | Status: SHIPPED | OUTPATIENT
Start: 2019-06-28 | End: 2019-07-31 | Stop reason: SDUPTHER

## 2019-06-28 NOTE — TELEPHONE ENCOUNTER
----- Message from Elizabeth Grant sent at 6/28/2019  9:21 AM CDT -----  Contact: [phone]  Patient is calling for an RX refill or new RX.  Is this a refill or new RX:  refillphentermine (ADIPEX-P) 37.5 mg tablet  RX name and strength: Take 1 tablet (37.5 mg total) by mouth once daily  Directions (copy/paste from chart):    Is this a 30 day or 90 day RX:  30  Local pharmacy or mail order pharmacy:  local  Pharmacy name and phone # (copy/paste from chart):   [facility] [address] [place] [address] AT [facility] OF [address]          [phone] (Phone)  [phone] (Fax)  Comments:  Please advise, thanks

## 2019-07-31 ENCOUNTER — TELEPHONE (OUTPATIENT)
Dept: INTERNAL MEDICINE | Facility: CLINIC | Age: 39
End: 2019-07-31

## 2019-07-31 RX ORDER — PHENTERMINE HYDROCHLORIDE 37.5 MG/1
37.5 TABLET ORAL DAILY
Qty: 30 TABLET | Refills: 0 | Status: SHIPPED | OUTPATIENT
Start: 2019-07-31 | End: 2019-09-10 | Stop reason: SDUPTHER

## 2019-07-31 NOTE — TELEPHONE ENCOUNTER
----- Message from Alise Cadet sent at 7/31/2019 11:33 AM CDT -----  Contact: [phone]  Type: Rx    Name of medication(s):  phentermine (ADIPEX-P) 37.5 mg tablet    Is this a refill? New rx? refill    Who prescribed medication?    Pharmacy Name, Phone, & Location: [facility] [address] [place] [address] AT [facility] OF [address] & [facility] 90 [phone]      Comments: please call and advise, Thanks

## 2019-08-26 NOTE — TELEPHONE ENCOUNTER
----- Message from Esha Martinez sent at 8/26/2019  3:29 PM CDT -----  Contact: Patient [phone]  Patient is calling for an RX refill or new RX.  Is this a refill or new RX:  refill  RX name and strength: phentermine (ADIPEX-P) 37.5 mg tablet  Directions (copy/paste from chart):    Is this a 30 day or 90 day RX:  30  Local pharmacy or mail order pharmacy:  local  Pharmacy name and phone # [facility] #[id number] - [facility] [address] [address] AT [facility] [address] [phone] (Phone)  [phone] (Fax)         Comments:    Please call and advise  Thank you           shortness of breath

## 2019-08-28 RX ORDER — PHENTERMINE HYDROCHLORIDE 37.5 MG/1
37.5 TABLET ORAL DAILY
Qty: 30 TABLET | Refills: 0 | OUTPATIENT
Start: 2019-08-28

## 2019-09-06 RX ORDER — PHENTERMINE HYDROCHLORIDE 37.5 MG/1
37.5 TABLET ORAL DAILY
Qty: 30 TABLET | Refills: 0 | Status: CANCELLED | OUTPATIENT
Start: 2019-09-06

## 2019-09-06 NOTE — TELEPHONE ENCOUNTER
----- Message from Anita Art sent at 9/6/2019  2:36 PM CDT -----  Contact: Pt self Mobile/Home [phone]  Patient is calling for an RX refill or new RX.  Is this a refill or new RX:  Refill  RX name and strength: phentermine (ADIPEX-P) 37.5 mg tablet  Directions (copy/paste from chart):  N/AIs this a 30 day or 90 day RX:  30  Local pharmacy or mail order pharmacy:  [id number] [address] AT [facility] [address]  Pharmacy name and phone # [facility] Phone# [phone], Fax# [phone]

## 2019-09-10 RX ORDER — PHENTERMINE HYDROCHLORIDE 37.5 MG/1
37.5 TABLET ORAL DAILY
Qty: 30 TABLET | Refills: 0 | Status: SHIPPED | OUTPATIENT
Start: 2019-09-10 | End: 2019-10-10 | Stop reason: SDUPTHER

## 2019-09-10 NOTE — TELEPHONE ENCOUNTER
----- Message from Yaima Parker sent at 9/10/2019  2:56 PM CDT -----  Contact: Patient [phone]  2nd Request    Prescription Request:     Name of medication: phentermine (ADIPEX-P) 37.5 mg tablet    Reason for request: Refill    Pharmacy: [facility] #[id number] [address] AT [facility] OF [address]    Please advise.    Thank You

## 2019-10-10 NOTE — TELEPHONE ENCOUNTER
----- Message from Luisana Reece sent at 10/10/2019  2:23 PM CDT -----  Contact: [phone]  Type: Rx    Name of medication(s): phentermine (ADIPEX-P) 37.5 mg tablet    Is this a refill? New rx? refill     Who prescribed medication? [facility]    Pharmacy Name, Phone, & Location: [facility] #[id number] [address] AT [facility] OF [address]     Comments:   Please advise,thank you

## 2019-10-11 RX ORDER — PHENTERMINE HYDROCHLORIDE 37.5 MG/1
37.5 TABLET ORAL DAILY
Qty: 30 TABLET | Refills: 0 | Status: SHIPPED | OUTPATIENT
Start: 2019-10-11 | End: 2019-11-25 | Stop reason: SDUPTHER

## 2019-11-18 RX ORDER — PHENTERMINE HYDROCHLORIDE 37.5 MG/1
37.5 TABLET ORAL DAILY
Qty: 30 TABLET | Refills: 0 | Status: CANCELLED | OUTPATIENT
Start: 2019-11-18

## 2019-11-18 NOTE — TELEPHONE ENCOUNTER
----- Message from Luisana Reece sent at 11/18/2019  2:31 PM CST -----  Contact: [phone]  Type: Rx    Name of medication(s): phentermine (ADIPEX-P) 37.5 mg tablet    Is this a refill? New rx? refill    Who prescribed medication?  Dr. Hurd    Pharmacy Name, Phone, & Location:  [facility] #[id number] [address] AT [facility] OF [address]    Comments:  Please advise, thank you

## 2019-11-25 NOTE — TELEPHONE ENCOUNTER
----- Message from Luis Parsons sent at 11/25/2019 11:06 AM CST -----  Contact: Pt [phone]  Is this a refill or new RX:  Refill    RX name and strength: phentermine (ADIPEX-P) 37.5 mg tablet    Pharmacy name and phone # [facility] #[id number] - [id number] [address] AT [facility] OF [address] [phone] (Phone) [phone] (Fax)

## 2019-11-26 NOTE — TELEPHONE ENCOUNTER
----- Message from Elizabeth Grant sent at 11/26/2019  3:07 PM CST -----  Contact: Pt [phone]    Is this a refill or new RX:  Refill     RX name and strength: phentermine (ADIPEX-P) 37.5 mg tablet     Pharmacy name and phone # [facility] #[id number] - [id number] [address] AT [facility] OF [address]            [phone] (Phone)   [phone] (Fax)

## 2019-11-27 RX ORDER — PHENTERMINE HYDROCHLORIDE 37.5 MG/1
37.5 TABLET ORAL DAILY
Qty: 30 TABLET | Refills: 0 | Status: SHIPPED | OUTPATIENT
Start: 2019-11-27 | End: 2020-01-18 | Stop reason: SDUPTHER

## 2020-01-13 RX ORDER — PHENTERMINE HYDROCHLORIDE 37.5 MG/1
37.5 TABLET ORAL DAILY
Qty: 30 TABLET | Refills: 0 | Status: CANCELLED | OUTPATIENT
Start: 2020-01-13

## 2020-01-13 NOTE — TELEPHONE ENCOUNTER
----- Message from Luisana Reece sent at 1/13/2020  2:06 PM CST -----  Contact: [phone]  Type: Rx    Name of medication(s): phentermine (ADIPEX-P) 37.5 mg tablet    Is this a refill? New rx?  refill    Who prescribed medication? Dr. Hurd    Pharmacy Name, Phone, & Location:  [facility] #[id number] [address] AT [facility] OF [address]    Comments:  Please advise, thank you

## 2020-01-18 NOTE — TELEPHONE ENCOUNTER
----- Message from Sarah Champagne sent at 1/18/2020 10:29 AM CST -----  Contact: self  [phone]  Patient is calling for an RX refill or new RX.  Is this a refill or new RX:  refill  RX name and strength:  phentermine (ADIPEX-P) 37.5 mg tablet   Directions (copy/paste from chart): Take 1 tablet (37.5 mg total) by mouth once daily.   Is this a 30 day or 90 day RX: 30   Local pharmacy or mail order pharmacy:  local  Pharmacy name and phone # (copy/paste from chart):   [facility]  [place]  [phone] (Phone)  [phone] (Fax)  Comments:

## 2020-01-20 RX ORDER — PHENTERMINE HYDROCHLORIDE 37.5 MG/1
37.5 TABLET ORAL DAILY
Qty: 30 TABLET | Refills: 0 | Status: SHIPPED | OUTPATIENT
Start: 2020-01-20 | End: 2020-02-20 | Stop reason: SDUPTHER

## 2020-04-24 RX ORDER — PHENTERMINE HYDROCHLORIDE 37.5 MG/1
37.5 TABLET ORAL DAILY
Qty: 30 TABLET | Refills: 0 | Status: SHIPPED | OUTPATIENT
Start: 2020-04-24 | End: 2020-06-01 | Stop reason: SDUPTHER

## 2020-04-24 NOTE — TELEPHONE ENCOUNTER
----- Message from Marielena Mathews sent at 4/24/2020  8:43 AM CDT -----  Contact: Patient [phone]  Requesting an RX refill or new RX.  Is this a refill or new RX:  Refill  RX name and strength: phentermine (ADIPEX-P) 37.5 mg tablet  Directions (copy/paste from chart):    Is this a 30 day or 90 day RX:    Local pharmacy or mail order pharmacy:  Local  Pharmacy name and phone # [id number] - [place], [place] - [address] [address] [facility] [address]  Comments:

## 2020-08-21 ENCOUNTER — TELEPHONE (OUTPATIENT)
Dept: INTERNAL MEDICINE | Facility: CLINIC | Age: 40
End: 2020-08-21

## 2020-08-21 NOTE — TELEPHONE ENCOUNTER
----- Message from Sarah Champagne sent at 8/21/2020  8:02 AM CDT -----  Contact: Betty Bill @  [phone]  Requesting an RX refill or new RX.  Is this a refill or new RX:  Refill  RX name and strength: Phentermine (ADIPEX-P) 37.5 mg tablet  Directions (copy/paste from chart):  Take 1 tablet (37.5 mg total) by mouth once daily  Is this a 30 day or 90 day RX:  30  Local pharmacy or mail order pharmacy: local    Pharmacy name and phone # (copy/paste from chart): [facility]   [phone] (Phone)  [phone] (Fax)    Comments:  Has called multiple time checking on refill for this Rx.

## 2020-08-24 RX ORDER — PHENTERMINE HYDROCHLORIDE 37.5 MG/1
37.5 TABLET ORAL DAILY
Qty: 30 TABLET | Refills: 0 | Status: SHIPPED | OUTPATIENT
Start: 2020-08-24 | End: 2020-09-29 | Stop reason: SDUPTHER

## 2020-09-29 RX ORDER — PHENTERMINE HYDROCHLORIDE 37.5 MG/1
37.5 TABLET ORAL DAILY
Qty: 30 TABLET | Refills: 0 | Status: SHIPPED | OUTPATIENT
Start: 2020-09-29 | End: 2021-10-20

## 2020-10-07 ENCOUNTER — PATIENT MESSAGE (OUTPATIENT)
Dept: ADMINISTRATIVE | Facility: HOSPITAL | Age: 40
End: 2020-10-07

## 2020-10-20 ENCOUNTER — OFFICE VISIT (OUTPATIENT)
Dept: URGENT CARE | Facility: CLINIC | Age: 40
End: 2020-10-20
Payer: COMMERCIAL

## 2020-10-20 VITALS
WEIGHT: 170 LBS | HEART RATE: 94 BPM | SYSTOLIC BLOOD PRESSURE: 118 MMHG | TEMPERATURE: 98 F | HEIGHT: 65 IN | BODY MASS INDEX: 28.32 KG/M2 | RESPIRATION RATE: 18 BRPM | DIASTOLIC BLOOD PRESSURE: 72 MMHG | OXYGEN SATURATION: 99 %

## 2020-10-20 DIAGNOSIS — M25.561 ACUTE PAIN OF RIGHT KNEE: Primary | ICD-10-CM

## 2020-10-20 DIAGNOSIS — M25.461 EFFUSION OF RIGHT KNEE: ICD-10-CM

## 2020-10-20 PROCEDURE — 73562 X-RAY EXAM OF KNEE 3: CPT | Mod: FY,RT,S$GLB, | Performed by: RADIOLOGY

## 2020-10-20 PROCEDURE — 73562 XR KNEE 3 VIEW RIGHT: ICD-10-PCS | Mod: FY,RT,S$GLB, | Performed by: RADIOLOGY

## 2020-10-20 PROCEDURE — 99214 OFFICE O/P EST MOD 30 MIN: CPT | Mod: 25,S$GLB,, | Performed by: PHYSICIAN ASSISTANT

## 2020-10-20 PROCEDURE — 96372 PR INJECTION,THERAP/PROPH/DIAG2ST, IM OR SUBCUT: ICD-10-PCS | Mod: S$GLB,,, | Performed by: FAMILY MEDICINE

## 2020-10-20 PROCEDURE — 96372 THER/PROPH/DIAG INJ SC/IM: CPT | Mod: S$GLB,,, | Performed by: FAMILY MEDICINE

## 2020-10-20 PROCEDURE — 99214 PR OFFICE/OUTPT VISIT, EST, LEVL IV, 30-39 MIN: ICD-10-PCS | Mod: 25,S$GLB,, | Performed by: PHYSICIAN ASSISTANT

## 2020-10-20 RX ORDER — BETAMETHASONE SODIUM PHOSPHATE AND BETAMETHASONE ACETATE 3; 3 MG/ML; MG/ML
6 INJECTION, SUSPENSION INTRA-ARTICULAR; INTRALESIONAL; INTRAMUSCULAR; SOFT TISSUE
Status: COMPLETED | OUTPATIENT
Start: 2020-10-20 | End: 2020-10-20

## 2020-10-20 RX ADMIN — BETAMETHASONE SODIUM PHOSPHATE AND BETAMETHASONE ACETATE 6 MG: 3; 3 INJECTION, SUSPENSION INTRA-ARTICULAR; INTRALESIONAL; INTRAMUSCULAR; SOFT TISSUE at 12:10

## 2020-10-20 NOTE — PROGRESS NOTES
"Subjective:       Patient ID: Betty Bill is a 40 y.o. female.    Vitals:  height is 5' 5" (1.651 m) and weight is 77.1 kg (170 lb).     Chief Complaint: Knee Pain    HPI    Musculoskeletal: Positive for pain, joint pain and joint swelling. Negative for trauma, arthritis and back pain.       Objective:      Physical Exam      Assessment:       No diagnosis found.    Plan:         There are no diagnoses linked to this encounter.       "

## 2020-10-20 NOTE — LETTER
October 20, 2020      [facility]  [address], [address]  [place] [address]  Phone: [phone]  Fax: [phone]       Patient: Betty Bill   YOB: 1980  Date of Visit: 10/20/2020    To Whom It May Concern:    Kia Bill  was at [facility] on 10/20/2020. She may return to work on 10/22/2020 with no restrictions. If you have any questions or concerns, or if I can be of further assistance, please do not hesitate to contact me.    Sincerely,          Modesto Albarran PA-C

## 2020-10-20 NOTE — PROGRESS NOTES
"Subjective:       Patient ID: Betty Bill is a 40 y.o. female.    Vitals:  height is 5' 5" (1.651 m) and weight is 77.1 kg (170 lb). Her temporal temperature is 97.9 °F (36.6 °C). Her blood pressure is 118/72 and her pulse is 94. Her respiration is 18 and oxygen saturation is 99%.     Chief Complaint: Knee Pain    Pt c/o right knee pain and swelling since yesterday.  She denies an injury but states it is stiff and the pain started yesterday morning.   Pt also reports a "crick" in her neck as she may have slept wrong.       Knee Pain   The incident occurred 12 to 24 hours ago. The incident occurred at home. There was no injury mechanism. The pain is present in the right knee. The quality of the pain is described as aching. The pain is at a severity of 5/10. The pain is moderate. The pain has been constant since onset. Pertinent negatives include no numbness or tingling. She reports no foreign bodies present. The symptoms are aggravated by movement. Treatments tried: aleve. The treatment provided mild relief.       Constitution: Negative for chills, fatigue and fever.   HENT: Negative for congestion and sore throat.    Neck: Negative for painful lymph nodes.   Cardiovascular: Negative for chest pain and leg swelling.   Eyes: Negative for double vision and blurred vision.   Respiratory: Negative for cough and shortness of breath.    Gastrointestinal: Negative for nausea, vomiting and diarrhea.   Genitourinary: Negative for dysuria, frequency, urgency and history of kidney stones.   Musculoskeletal: Positive for pain, joint pain and joint swelling. Negative for arthritis, gout, muscle cramps and muscle ache.   Skin: Negative for color change, pale, rash, erythema and bruising.   Allergic/Immunologic: Negative for seasonal allergies.   Neurological: Negative for dizziness, history of vertigo, light-headedness, passing out, headaches and numbness.   Hematologic/Lymphatic: Negative for swollen lymph nodes. "   Psychiatric/Behavioral: Negative for nervous/anxious, sleep disturbance and depression. The patient is not nervous/anxious.        Objective:      Physical Exam   Constitutional: She is oriented to person, place, and time. She appears well-developed.   HENT:   Head: Normocephalic and atraumatic. Head is without abrasion, without contusion and without laceration.   Ears:   Right Ear: External ear normal. Decreased hearing is noted.   Left Ear: External ear normal. Decreased hearing is noted.   Nose: Nose normal.   Hearing impaired; reads lips      Comments: Hearing impaired; reads lips  Eyes: Pupils are equal, round, and reactive to light. Conjunctivae, EOM and lids are normal.   Neck: Full passive range of motion without pain and phonation normal. Neck supple.   Cardiovascular: Normal rate.   Pulmonary/Chest: Effort normal. No respiratory distress.   Musculoskeletal:      Right knee: She exhibits decreased range of motion, swelling and effusion. She exhibits no ecchymosis, no deformity, no laceration, no erythema, normal alignment, no LCL laxity, normal patellar mobility, no bony tenderness, normal meniscus and no MCL laxity. Tenderness found. Medial joint line, lateral joint line and patellar tendon tenderness noted.   Neurological: She is alert and oriented to person, place, and time.   Skin: Skin is warm, dry, intact and no rash. Capillary refill takes less than 2 seconds. not right kneeabrasion, burn, bruising, erythema and ecchymosisPsychiatric: Her speech is normal and behavior is normal. Judgment and thought content normal.   Nursing note and vitals reviewed.          Xr Knee 3 View Right    Result Date: 10/20/2020  EXAMINATION: XR KNEE 3 VIEW RIGHT CLINICAL HISTORY: Pain in right knee FINDINGS: Three views right: No fracture dislocation bone destruction seen.  There is a small joint effusion. Electronically signed by: Harjeet Bess MD Date:    10/20/2020 Time:    11:32    Results reviewed with  pt.  Assessment:       1. Acute pain of right knee    2. Effusion of right knee        Plan:         Acute pain of right knee  -     Ambulatory referral/consult to Smoking Cessation Program  -     XR KNEE 3 VIEW RIGHT; Future; Expected date: 10/20/2020  -     KNEE BRACE FOR HOME USE  -     betamethasone acetate-betamethasone sodium phosphate injection 6 mg    Effusion of right knee           Pt requested steroid shot instead of pills.  Pt advised to f/u with PCP if symptoms persist for possible MRI referral and/or orthopedic referral.    Patient Instructions       Water on the Knee    Water on the knee is also known as knee effusion. The knee joint normally has less than 1 ounce of fluid. Injury or inflammation of the knee joint causes extra fluid to collect there. When this happens, the knee joint looks swollen and is usually painful. It may be hard to fully bend the knee.  The most common cause of water on the knee is osteoarthritis due to wear and tear on the joint cartilage. Other causes include injury to the cartilage, inflammatory arthritis such as gout or rheumatoid arthritis, and infection of the joint.  You may need a needle aspiration, if the cause of your water on the knee is not certain. This procedure removes a sample of joint fluid from the knee for testing. This is done with a local anesthetic. Removing excess fluid may also relieve swelling and pain.  Home care  · Limit your activities. Stay off the injured leg as much as possible until pain improves.  · Keep your leg elevated to reduce pain and swelling. When sleeping, place a pillow under the injured leg. When sitting, support the injured leg so it is level with your waist. This is very important during the first 48 hours.  · Apply an ice pack over the injured area for 15 to 20 minutes every 3 to 6 hours. You should do this for the first 24 to 48 hours. You can make an ice pack by filling a plastic bag that seals at the top with ice cubes and then  wrapping it with a thin towel. Continue to use ice packs for relief of pain and swelling as needed. As the ice melts, be careful to avoid getting your wrap, splint, or cast wet. After 48 hours, apply heat(warm shower or warm bath) for 15 to 20 minutes several times a day, or alternate ice and heat. If you have to wear a hook-and-loop knee brace, you can open it to apply the ice pack, or heat, directly to the knee. Never put ice directly on the skin. Always wrap the ice in a towel or other type of cloth.  · You may use over-the-counter pain medicine to control pain, unless another pain medicine was prescribed. If you have chronic liver or kidney disease or have ever had a stomach ulcer or GI bleeding, talk with your healthcare provider before using these medicines.  · If crutches or a walker have been recommended, do not put weight on the injured leg until you can do so without pain. Check with your healthcare provider before returning to sports or full work duties.  · If you have a hook-and-loop knee brace, you can remove it to bathe and sleep, unless told otherwise.  Follow-up care  Follow up with your healthcare provider as advised.  If you are overweight, talk to your healthcare provider about a weight loss program. The excess weight puts extra strain on your knees.  When to seek medical advice  Call your healthcare provider right away if any of these occur:  · Increasing pain, redness, or swelling of the knee  · Fever of 100.4°F (38°C) or above lasting for 24 to 48 hours  Date Last Reviewed: 11/23/2015  © 0296-5157 [employer]. [address] [place] [address], [place] [address]. All rights reserved. This information is not intended as a substitute for professional medical care. Always follow your healthcare professional's instructions.        Knee Pain  Knee pain is very common. Its especially common in active people who put a lot of pressure on their knees, like runners. It affects women more often than  men.  Your kneecap (patella) is a thick, round bone. It covers and protects the front portion of your knee joint. It moves along a groove in your thighbone (femur) as part of the patellofemoral joint. A layer of cartilage surrounds the underside of your kneecap. This layer protects it from grinding against your femur.  When this cartilage softens and breaks down, it can cause knee pain. This is partly because of repetitive stress. The stress irritates the lining of the joint. This causes pain in the underlying bone.  What causes knee pain?  Many things can cause knee pain. You may have more than one cause. Some of these include:  · Overuse of the knee joint  · The kneecap doesnt line up with the tissue around it  · Damage to small nerves in the area  · Damage to the ligament-like structure that holds the kneecap in place (retinaculum)  · Breakdown of the bone under the cartilage  · Swelling in the soft tissues around the kneecap  · Injury  You might be more likely to have knee pain if you:  · Exercise a lot  · Recently increased the intensity of your workouts  · Have a body mass index (BMI) greater than 25  · Have poor alignment of your kneecap  · Walk with your feet turned overly outward or inward  · Have weakness in surrounding muscle groups (inner quad or hip adductor muscles)  · Have too much tightness in surrounding muscle groups (hamstrings or iliotibial band)  · Have a recent history of injury to the area  · Are female  Symptoms of knee pain  This type of knee pain is a dull, aching pain in the front of the knee in the area under and around the kneecap. This pain may start quickly or slowly. Your pain might be worse when you squat, run, or sit for a long time. You might also sometimes feel like your knee is giving out. You may have symptoms in one or both of your knees.  Diagnosing knee pain  Your healthcare provider will ask about your medical history and your symptoms. Be sure to describe any activities  that make your knee pain worse. He or she will look at your knee. This will include tests of your range of motion, strength, and areas of pain of your knee. Your knee alignment will be checked.  Your healthcare provider will need to rule out other causes of your knee pain, such as arthritis. You may need an imaging test, such as an X-ray or MRI.  Treatment for knee pain  Treatments that can help ease your symptoms may include:  · Avoiding activities for a while that make your pain worse, returning to activity over time  · Icing the outside of your knee when it causes you pain  · Taking over-the-counter pain medicine  · Wearing a knee brace or taping your knee to support it  · Wearing special shoe inserts to help keep your feet in the proper alignment  · Doing special exercises to stretch and strengthen the muscles around your hip and your knee  These steps help most people manage knee pain. But some cases of knee pain need to be treated with surgery. You may need surgery right away. Or you may need it later if other treatments dont work. Your healthcare provider may refer you to an orthopedic surgeon. He or she will talk with you about your choices.  Preventing knee pain  Losing weight and correcting excess muscle tightness or muscle weakness may help lower your risk.  In some cases, you can prevent knee pain. To help prevent a flare-up of knee pain, you do these things:  · Regularly do all the exercises your doctor or physical therapist advises  · Support your knee as advised by your doctor or physical therapist  · Increase training gradually, and ease up on training when needed  · Have an expert check your gait for running or other sporting activities  · Stretch properly before and after exercise  · Replace your running shoes regularly  · Lose excess weight     When to call your healthcare provider  Call your healthcare provider right away if:  · Your symptoms dont get better after a few weeks of  treatment  · You have any new symptoms   Date Last Reviewed: 4/1/2017  © 8933-4200 [employer]. [address] [place] [address], [place] [address]. All rights reserved. This information is not intended as a substitute for professional medical care. Always follow your healthcare professional's instructions.        R.I.C.E.    R.I.C.E. stands for Rest, Ice, Compression, and Elevation. Doing these things helps limit pain and swelling after an injury. R.I.C.E. also helps injuries heal faster. Use R.I.C.E. for sprains, strains, and severe bruises or bumps. Follow the tips on this handout and begin R.I.C.E. as soon as possible after an injury.  ? Rest  Pain is your bodys way of telling you to rest an injured area. Whether you have hurt an elbow, hand, foot, or knee, limiting its use will prevent further injury and help you heal.  ? Ice  Applying ice right after an injury helps prevent swelling and reduce pain. Dont place ice directly on your skin.  · Wrap a cold pack or bag of ice in a thin cloth. Place it over the injured area.  · Ice for 10 minutes every 3 hours. Dont ice for more than 20 minutes at a time.  ? Compression  Putting pressure (compression) on an injury helps prevent swelling and provides support.  · Wrap the injured area firmly with an elastic bandage. If your hand or foot tingles, becomes discolored, or feels cold to the touch, the bandage may be too tight. Rewrap it more loosely.  · If your bandage becomes too loose, rewrap it.  · Do not wear an elastic bandage overnight.  ? Elevation  Keeping an injury elevated helps reduce swelling, pain, and throbbing. Elevation is most effective when the injury is kept elevated higher than the heart.     Call your healthcare provider if you notice any of the following:  · Fingers or toes feel numb, are cold to the touch, or change color  · Skin looks shiny or tight  · Pain, swelling, or bruising worsens and is not improved with elevation   Date Last Reviewed:  9/3/2015  © 4633-6697 [employer]. [address] [place] [address], [place] [address]. All rights reserved. This information is not intended as a substitute for professional medical care. Always follow your healthcare professional's instructions.      You must understand that you've received an Urgent Care treatment only and that you may be released before all your medical problems are known or treated. You, the patient, will arrange for follow up care as instructed.    Follow up with your PCP or specialty clinic as directed in the next 1-2 weeks if not improved or as needed. You can call [phone] to schedule an appointment with the appropriate provider.    If your condition worsens we recommend that you receive another evaluation at the emergency room immediately or contact your primary medical clinic's after hours call service to discuss your concerns.    Please go to the Emergency Department for any concerns or worsening of condition.      You received a steroid shot today - this can elevate your blood pressure, elevate your blood sugar, water weight gain, nervous energy, redness to the face and dimpling of the skin where the shot goes in.

## 2020-10-20 NOTE — PATIENT INSTRUCTIONS
Water on the Knee    Water on the knee is also known as knee effusion. The knee joint normally has less than 1 ounce of fluid. Injury or inflammation of the knee joint causes extra fluid to collect there. When this happens, the knee joint looks swollen and is usually painful. It may be hard to fully bend the knee.  The most common cause of water on the knee is osteoarthritis due to wear and tear on the joint cartilage. Other causes include injury to the cartilage, inflammatory arthritis such as gout or rheumatoid arthritis, and infection of the joint.  You may need a needle aspiration, if the cause of your water on the knee is not certain. This procedure removes a sample of joint fluid from the knee for testing. This is done with a local anesthetic. Removing excess fluid may also relieve swelling and pain.  Home care  · Limit your activities. Stay off the injured leg as much as possible until pain improves.  · Keep your leg elevated to reduce pain and swelling. When sleeping, place a pillow under the injured leg. When sitting, support the injured leg so it is level with your waist. This is very important during the first 48 hours.  · Apply an ice pack over the injured area for 15 to 20 minutes every 3 to 6 hours. You should do this for the first 24 to 48 hours. You can make an ice pack by filling a plastic bag that seals at the top with ice cubes and then wrapping it with a thin towel. Continue to use ice packs for relief of pain and swelling as needed. As the ice melts, be careful to avoid getting your wrap, splint, or cast wet. After 48 hours, apply heat(warm shower or warm bath) for 15 to 20 minutes several times a day, or alternate ice and heat. If you have to wear a hook-and-loop knee brace, you can open it to apply the ice pack, or heat, directly to the knee. Never put ice directly on the skin. Always wrap the ice in a towel or other type of cloth.  · You may use over-the-counter pain medicine to control  pain, unless another pain medicine was prescribed. If you have chronic liver or kidney disease or have ever had a stomach ulcer or GI bleeding, talk with your healthcare provider before using these medicines.  · If crutches or a walker have been recommended, do not put weight on the injured leg until you can do so without pain. Check with your healthcare provider before returning to sports or full work duties.  · If you have a hook-and-loop knee brace, you can remove it to bathe and sleep, unless told otherwise.  Follow-up care  Follow up with your healthcare provider as advised.  If you are overweight, talk to your healthcare provider about a weight loss program. The excess weight puts extra strain on your knees.  When to seek medical advice  Call your healthcare provider right away if any of these occur:  · Increasing pain, redness, or swelling of the knee  · Fever of 100.4°F (38°C) or above lasting for 24 to 48 hours  Date Last Reviewed: 11/23/2015  © 5267-7898 [employer]. [address] [place] [address]. All rights reserved. This information is not intended as a substitute for professional medical care. Always follow your healthcare professional's instructions.        Knee Pain  Knee pain is very common. Its especially common in active people who put a lot of pressure on their knees, like runners. It affects women more often than men.  Your kneecap (patella) is a thick, round bone. It covers and protects the front portion of your knee joint. It moves along a groove in your thighbone (femur) as part of the patellofemoral joint. A layer of cartilage surrounds the underside of your kneecap. This layer protects it from grinding against your femur.  When this cartilage softens and breaks down, it can cause knee pain. This is partly because of repetitive stress. The stress irritates the lining of the joint. This causes pain in the underlying bone.  What causes knee pain?  Many things can  cause knee pain. You may have more than one cause. Some of these include:  · Overuse of the knee joint  · The kneecap doesnt line up with the tissue around it  · Damage to small nerves in the area  · Damage to the ligament-like structure that holds the kneecap in place (retinaculum)  · Breakdown of the bone under the cartilage  · Swelling in the soft tissues around the kneecap  · Injury  You might be more likely to have knee pain if you:  · Exercise a lot  · Recently increased the intensity of your workouts  · Have a body mass index (BMI) greater than 25  · Have poor alignment of your kneecap  · Walk with your feet turned overly outward or inward  · Have weakness in surrounding muscle groups (inner quad or hip adductor muscles)  · Have too much tightness in surrounding muscle groups (hamstrings or iliotibial band)  · Have a recent history of injury to the area  · Are female  Symptoms of knee pain  This type of knee pain is a dull, aching pain in the front of the knee in the area under and around the kneecap. This pain may start quickly or slowly. Your pain might be worse when you squat, run, or sit for a long time. You might also sometimes feel like your knee is giving out. You may have symptoms in one or both of your knees.  Diagnosing knee pain  Your healthcare provider will ask about your medical history and your symptoms. Be sure to describe any activities that make your knee pain worse. He or she will look at your knee. This will include tests of your range of motion, strength, and areas of pain of your knee. Your knee alignment will be checked.  Your healthcare provider will need to rule out other causes of your knee pain, such as arthritis. You may need an imaging test, such as an X-ray or MRI.  Treatment for knee pain  Treatments that can help ease your symptoms may include:  · Avoiding activities for a while that make your pain worse, returning to activity over time  · Icing the outside of your knee when  it causes you pain  · Taking over-the-counter pain medicine  · Wearing a knee brace or taping your knee to support it  · Wearing special shoe inserts to help keep your feet in the proper alignment  · Doing special exercises to stretch and strengthen the muscles around your hip and your knee  These steps help most people manage knee pain. But some cases of knee pain need to be treated with surgery. You may need surgery right away. Or you may need it later if other treatments dont work. Your healthcare provider may refer you to an orthopedic surgeon. He or she will talk with you about your choices.  Preventing knee pain  Losing weight and correcting excess muscle tightness or muscle weakness may help lower your risk.  In some cases, you can prevent knee pain. To help prevent a flare-up of knee pain, you do these things:  · Regularly do all the exercises your doctor or physical therapist advises  · Support your knee as advised by your doctor or physical therapist  · Increase training gradually, and ease up on training when needed  · Have an expert check your gait for running or other sporting activities  · Stretch properly before and after exercise  · Replace your running shoes regularly  · Lose excess weight     When to call your healthcare provider  Call your healthcare provider right away if:  · Your symptoms dont get better after a few weeks of treatment  · You have any new symptoms   Date Last Reviewed: 4/1/2017  © 2401-1609 [employer]. [address] [place] [address], [place] [address]. All rights reserved. This information is not intended as a substitute for professional medical care. Always follow your healthcare professional's instructions.        R.I.C.E.    R.I.C.E. stands for Rest, Ice, Compression, and Elevation. Doing these things helps limit pain and swelling after an injury. R.I.C.E. also helps injuries heal faster. Use R.I.C.E. for sprains, strains, and severe bruises or bumps. Follow the tips  on this handout and begin R.I.C.E. as soon as possible after an injury.  ? Rest  Pain is your bodys way of telling you to rest an injured area. Whether you have hurt an elbow, hand, foot, or knee, limiting its use will prevent further injury and help you heal.  ? Ice  Applying ice right after an injury helps prevent swelling and reduce pain. Dont place ice directly on your skin.  · Wrap a cold pack or bag of ice in a thin cloth. Place it over the injured area.  · Ice for 10 minutes every 3 hours. Dont ice for more than 20 minutes at a time.  ? Compression  Putting pressure (compression) on an injury helps prevent swelling and provides support.  · Wrap the injured area firmly with an elastic bandage. If your hand or foot tingles, becomes discolored, or feels cold to the touch, the bandage may be too tight. Rewrap it more loosely.  · If your bandage becomes too loose, rewrap it.  · Do not wear an elastic bandage overnight.  ? Elevation  Keeping an injury elevated helps reduce swelling, pain, and throbbing. Elevation is most effective when the injury is kept elevated higher than the heart.     Call your healthcare provider if you notice any of the following:  · Fingers or toes feel numb, are cold to the touch, or change color  · Skin looks shiny or tight  · Pain, swelling, or bruising worsens and is not improved with elevation   Date Last Reviewed: 9/3/2015  © 9129-1513 [employer]. [address] [place] [address], [place] [address]. All rights reserved. This information is not intended as a substitute for professional medical care. Always follow your healthcare professional's instructions.      You must understand that you've received an Urgent Care treatment only and that you may be released before all your medical problems are known or treated. You, the patient, will arrange for follow up care as instructed.    Follow up with your PCP or specialty clinic as directed in the next 1-2 weeks if not improved or  as needed. You can call [phone] to schedule an appointment with the appropriate provider.    If your condition worsens we recommend that you receive another evaluation at the emergency room immediately or contact your primary medical clinic's after hours call service to discuss your concerns.    Please go to the Emergency Department for any concerns or worsening of condition.      You received a steroid shot today - this can elevate your blood pressure, elevate your blood sugar, water weight gain, nervous energy, redness to the face and dimpling of the skin where the shot goes in.

## 2020-10-23 ENCOUNTER — TELEPHONE (OUTPATIENT)
Dept: URGENT CARE | Facility: CLINIC | Age: 40
End: 2020-10-23

## 2020-12-10 ENCOUNTER — OFFICE VISIT (OUTPATIENT)
Dept: URGENT CARE | Facility: CLINIC | Age: 40
End: 2020-12-10
Payer: COMMERCIAL

## 2020-12-10 VITALS
HEIGHT: 65 IN | TEMPERATURE: 98 F | HEART RATE: 82 BPM | BODY MASS INDEX: 27.16 KG/M2 | OXYGEN SATURATION: 99 % | DIASTOLIC BLOOD PRESSURE: 77 MMHG | RESPIRATION RATE: 16 BRPM | SYSTOLIC BLOOD PRESSURE: 115 MMHG | WEIGHT: 163 LBS

## 2020-12-10 DIAGNOSIS — J32.9 SINUSITIS, UNSPECIFIED CHRONICITY, UNSPECIFIED LOCATION: ICD-10-CM

## 2020-12-10 DIAGNOSIS — Z20.822 CLOSE EXPOSURE TO COVID-19 VIRUS: Primary | ICD-10-CM

## 2020-12-10 LAB
CTP QC/QA: YES
CTP QC/QA: YES
FLUAV AG NPH QL: NEGATIVE
FLUBV AG NPH QL: NEGATIVE
SARS-COV-2 RDRP RESP QL NAA+PROBE: NEGATIVE

## 2020-12-10 PROCEDURE — U0002 COVID-19 LAB TEST NON-CDC: HCPCS | Mod: QW,S$GLB,, | Performed by: NURSE PRACTITIONER

## 2020-12-10 PROCEDURE — 99214 PR OFFICE/OUTPT VISIT, EST, LEVL IV, 30-39 MIN: ICD-10-PCS | Mod: 25,S$GLB,CS, | Performed by: NURSE PRACTITIONER

## 2020-12-10 PROCEDURE — 87804 INFLUENZA ASSAY W/OPTIC: CPT | Mod: QW,S$GLB,, | Performed by: NURSE PRACTITIONER

## 2020-12-10 PROCEDURE — 87804 POCT INFLUENZA A/B: ICD-10-PCS | Mod: 59,QW,S$GLB, | Performed by: NURSE PRACTITIONER

## 2020-12-10 PROCEDURE — U0002: ICD-10-PCS | Mod: QW,S$GLB,, | Performed by: NURSE PRACTITIONER

## 2020-12-10 PROCEDURE — 3008F BODY MASS INDEX DOCD: CPT | Mod: CPTII,S$GLB,, | Performed by: NURSE PRACTITIONER

## 2020-12-10 PROCEDURE — 3008F PR BODY MASS INDEX (BMI) DOCUMENTED: ICD-10-PCS | Mod: CPTII,S$GLB,, | Performed by: NURSE PRACTITIONER

## 2020-12-10 PROCEDURE — 99214 OFFICE O/P EST MOD 30 MIN: CPT | Mod: 25,S$GLB,CS, | Performed by: NURSE PRACTITIONER

## 2020-12-10 NOTE — LETTER
[address] [facility] [address] ? [place] [address] ? Phone [phone] ? Fax [phone]           Return to Work/School    Patient: Betty Bill  YOB: 1980   Date: 12/10/2020      To Whom It May Concern:     Betty Bill was in contact with/seen in my office on 12/10/2020. COVID-19 is present in our communities across the state. Not all patients are eligible or appropriate to be tested. In this situation, your employee meets the following criteria:     Betty Bill has met the criteria for COVID-19 testing and has a Negative result. Patient had an unmasked exposure to a known COVID -19 positive person and has been less than 6 ft for more than 15 minutes which meets the requirements to quarantine for 10 days per the CDC guidelines.  She may return to work after 10 days have passed from day of exposure AND asymptomatic .     If you have any questions or concerns, or if I can be of further assistance, please do not hesitate to contact me.     Sincerely,    Emmy Allen NP

## 2020-12-10 NOTE — PATIENT INSTRUCTIONS
"Zyrtec, Claritin, or Allegra OTC as directed for the next 7 days  Add a decongestant to your antihistamine for congestion- like Zyrtec-D, Claritin D, Allegra D-this may increase your blood pressure.   If high blood pressure, an alternative decongestant is Coricidin HBP   Flonase OTC as directed for the next 7 days  Salt Water Nasal Spray OTC 3x/day for the next 7 days   You can try breathe right strips at night to help you breathe.  A cool mist humidifier in bedroom may help with cough and relieve stuffy nose.      Follow up with Primary Care or ENT if not improved in 7-10 Days  Follow up with your PCP or specialty clinic within the week if not improved or as needed.  You can call [phone] to schedule an appointment with the appropriate provider.  You must understand that you've received an Urgent Care treatment only and that you may be released before all your medical problems are known or treated. You, the patient, will arrange for follow up care as instructed.  If your condition worsens we recommend that you receive another evaluation at the emergency room immediately or contact your primary medical clinics after hours call service to discuss your concerns.  Please return here or go to the Emergency Department for any concerns or worsening of condition.    Highly Recommend you take Vitamin C & D and Zinc to enhance immune system!    You have tested NEGATIVE for COVID-19 today. If you did not have a close exposure (as defined below) you can return to your normal daily activities to include social distancing, wearing a mask and frequent handwashing. A "close exposure" is defined as anyone who has had an exposure (masked or unmasked) to a known COVID -19 positive person within 6 ft for longer than 15 minutes. If your exposure meets this definition, you are required by CDC guidelines to quarantine for at least 7-10 days from time of exposure. The CDC states that a test can be performed for an asymptomatic " patient (someone who does not have any symptoms) after a close exposure, and that a test should be done if you develop symptoms after a close exposure as described above. Specifically, you can test at day 5 or later if asymptomatic in order to get released from quarantine on day 7 or later. If you develop symptoms sooner, you should test when your symptoms start. If you developed symptoms since the exposure, and your test was negative today and less than 5 days from your exposure, you still have to quarantine for 7-10 days from the date of the exposure. The 7-10 day quarantine begins from the day you were exposed, not the day of your test. For example, if your exposure was on a Monday, and you waited until Friday of the same week to get tested and it was negative, your 7-10 day quarantine begins from that Monday, not the Friday you tested negative. Please note, if you decide to test as an asymptomatic during your quarantine and you are positive, you will be restarting your quarantine and moving from a possible 10 day quarantine (if you do not test), to a 11 day or greater quarantine.     Separate yourself from other people and animals in your home.   Call ahead before visiting your doctor.   Wear a facemask.   Cover your coughs and sneezes.   Wash your hands often with soap and water; hand  can be used, too.   Avoid sharing personal household items.   Wipe down surfaces used daily.   Monitor your symptoms. Seek prompt medical attention if your illness is worsening (e.g., difficulty breathing).    Before seeking care, call your healthcare provider.   If you have a medical emergency and need to call 911, notify the dispatch personnel that you have, or are being evaluated for COVID-19. If possible, put on a facemask before emergency medical services arrive.          Recommended precautions for household members, intimate partners, and caregivers in a home setting of a patient with symptomatic  laboratory-confirmed COVID-19 or a patient under investigation.  Household members, intimate partners, and caregivers in the home setting awaiting tests results have close contact with a person with symptomatic, laboratory-confirmed COVID-19 or a person under investigation. Close contacts should monitor their health; they should call their provider right away if they develop symptoms suggestive of COVID-19 (e.g., fever, cough, shortness of breath).    Close contacts should also follow these recommendations:   Make sure that you understand and can help the patient follow their provider's instructions for medication(s) and care. You should help the patient with basic needs in the home and provide support for getting groceries, prescriptions, and other personal needs.   Monitor the patient's symptoms. If the patient is getting sicker, call his or her healthcare provider and tell them that the patient has laboratory-confirmed COVID-19. If the patient has a medical emergency and you need to call 911, notify the dispatch personnel that the patient has, or is being evaluated for COVID-19.   Household members should stay in another room or be  from the patient. Household members should use a separate bedroom and bathroom, if available.   Prohibit visitors.   Household members should care for any pets in the home.   Make sure that shared spaces in the home have good air flow, such as by an air conditioner or an opened window, weather permitting.   Perform hand hygiene frequently. Wash your hands often with soap and water for at least 20 seconds or use an alcohol-based hand  (that contains > 60% alcohol) covering all surfaces of your hands and rubbing them together until they feel dry. Soap and water should be used preferentially.   Avoid touching your eyes, nose, and mouth.   The patient should wear a facemask. If the patient is not able to wear a facemask (for example, because it causes trouble  breathing), caregivers should wear a mask when they are in the same room as the patient.   Wear a disposable facemask and gloves when you touch or have contact with the patient's blood, stool, or body fluids, such as saliva, sputum, nasal mucus, vomit, urine.  o Throw out disposable facemasks and gloves after using them. Do not reuse.  o When removing personal protective equipment, first remove and dispose of gloves. Then, immediately clean your hands with soap and water or alcohol-based hand . Next, remove and dispose of facemask, and immediately clean your hands again with soap and water or alcohol-based hand .   You should not share dishes, drinking glasses, cups, eating utensils, towels, bedding, or other items with the patient. After the patient uses these items, you should wash them thoroughly (see below Wash laundry thoroughly).   Clean all high-touch surfaces, such as counters, tabletops, doorknobs, bathroom fixtures, toilets, phones, keyboards, tablets, and bedside tables, every day. Also, clean any surfaces that may have blood, stool, or body fluids on them.   Use a household cleaning spray or wipe, according to the label instructions. Labels contain instructions for safe and effective use of the cleaning product including precautions you should take when applying the product, such as wearing gloves and making sure you have good ventilation during use of the product.   Wash laundry thoroughly.  o Immediately remove and wash clothes or bedding that have blood, stool, or body fluids on them.  o Wear disposable gloves while handling soiled items and keep soiled items away from your body. Clean your hands (with soap and water or an alcohol-based hand ) immediately after removing your gloves.  o Read and follow directions on labels of laundry or clothing items and detergent. In general, using a normal laundry detergent according to washing machine instructions and dry  thoroughly using the warmest temperatures recommended on the clothing label.   Place all used disposable gloves, facemasks, and other contaminated items in a lined container before disposing of them with other household waste. Clean your hands (with soap and water or an alcohol-based hand ) immediately after handling these items. Soap and water should be used preferentially if hands are visibly dirty.   Discuss any additional questions with your state or local health department or healthcare provider. Check available hours when contacting your local health department.    For more information see CDC link below.      https://www.cdc.gov/coronavirus/2019-ncov/hcp/guidance-prevent-spread.html#precautions        Sources:  [facility], [facility] and [facility]    If you were prescribed a narcotic or controlled medication, do not drive or operate heavy equipment or machinery while taking these medications.  You must understand that you've received an Urgent Care treatment only and that you may be released before all your medical problems are known or treated. You, the patient, will arrange for follow up care as instructed.  Follow up with your PCP or specialty clinic as directed within 2-5 days if not improved or as needed.  You can call [phone] to schedule an appointment with the appropriate provider.  If your condition worsens we recommend that you receive another evaluation at the emergency room immediately or contact your primary medical clinics after hours call service to discuss your concerns.  Please return here or go to the Emergency Department for any concerns or worsening of condition.                       [employer]

## 2020-12-10 NOTE — PROGRESS NOTES
"Subjective:       Patient ID: Betty Bill is a 40 y.o. female.    Vitals:  height is 5' 5" (1.651 m) and weight is 73.9 kg (163 lb). Her temporal temperature is 97.5 °F (36.4 °C). Her blood pressure is 115/77 and her pulse is 82. Her respiration is 16 and oxygen saturation is 99%.     Chief Complaint: Sinus Problem    40 yr old female presents to the Urgent Care with complaint of sinus pressure x 3 day. Patient also reports close exposure to known covid family member on 12/05/2020. Patient denies any CP, SOB, abdominal pain or fever at this time.     Sinus Problem  This is a new problem. The current episode started yesterday. The problem has been gradually worsening since onset. There has been no fever. Her pain is at a severity of 3/10. The pain is mild. Associated symptoms include sinus pressure. Pertinent negatives include no chills, congestion, coughing, diaphoresis, ear pain, headaches, hoarse voice, neck pain, shortness of breath, sneezing, sore throat or swollen glands. Past treatments include nothing. The treatment provided no relief.       Constitution: Negative for chills, sweating, fatigue and fever.   HENT: Positive for sinus pressure. Negative for ear pain, congestion, sinus pain, sore throat and voice change.    Neck: Negative for neck pain and painful lymph nodes.   Cardiovascular: Negative for chest pain and sob on exertion.   Eyes: Negative for eye redness.   Respiratory: Negative for chest tightness, cough, sputum production, bloody sputum, COPD, shortness of breath, stridor, wheezing and asthma.    Gastrointestinal: Negative for abdominal pain.   Musculoskeletal: Negative for pain.   Allergic/Immunologic: Negative for seasonal allergies, asthma and sneezing.   Neurological: Negative for headaches, disorientation and altered mental status.   Hematologic/Lymphatic: Negative for swollen lymph nodes.   Psychiatric/Behavioral: Negative for altered mental status, disorientation, confusion and agitation. "       Objective:      Physical Exam   Constitutional: She is oriented to person, place, and time. She appears well-developed. She is cooperative.  Non-toxic appearance. She does not appear ill. No distress.   HENT:   Head: Normocephalic and atraumatic.   Ears:   Right Ear: Hearing, tympanic membrane, abnromal external ear and ear canal normal.   Left Ear: Hearing, tympanic membrane, abnormal external ear and ear canal normal.   Nose: No mucosal edema, rhinorrhea or nasal deformity. No epistaxis. Right sinus exhibits maxillary sinus tenderness. Right sinus exhibits no frontal sinus tenderness. Left sinus exhibits maxillary sinus tenderness. Left sinus exhibits no frontal sinus tenderness.   Mouth/Throat: Uvula is midline, oropharynx is clear and moist and mucous membranes are normal. No trismus in the jaw. Normal dentition. No uvula swelling. No oropharyngeal exudate, posterior oropharyngeal edema or posterior oropharyngeal erythema. Tonsils are 2+ on the right. Tonsils are 2+ on the left. No tonsillar exudate.   Eyes: Pupils are equal, round, and reactive to light. Conjunctivae, EOM and lids are normal. No scleral icterus.   Neck: Trachea normal, full passive range of motion without pain and phonation normal. Neck supple. No neck rigidity. No edema and no erythema present.   Cardiovascular: Normal rate, regular rhythm, normal heart sounds and normal pulses.   Pulses:       Radial pulses are 2+ on the right side and 2+ on the left side.   Pulmonary/Chest: Effort normal and breath sounds normal. No accessory muscle usage. No respiratory distress. She has no decreased breath sounds. She has no rhonchi.   Musculoskeletal: Normal range of motion.         General: No deformity.   Neurological: She is alert and oriented to person, place, and time. She exhibits normal muscle tone. Coordination and gait normal.   Skin: Skin is warm, dry, intact, not diaphoretic and not pale. Capillary refill takes less than 2 seconds.  Psychiatric: Her speech is normal and behavior is normal. Judgment and thought content normal.   Nursing note and vitals reviewed.        Assessment:       1. Close exposure to COVID-19 virus    2. Sinusitis, unspecified chronicity, unspecified location        Results for orders placed or performed in visit on 12/10/20   POCT COVID-19 Rapid Screening   Result Value Ref Range    POC Rapid COVID Negative Negative     Acceptable Yes    POCT Influenza A/B   Result Value Ref Range    Rapid Influenza A Ag Negative Negative    Rapid Influenza B Ag Negative Negative     Acceptable Yes        Plan:         Close exposure to COVID-19 virus    Sinusitis, unspecified chronicity, unspecified location  -     POCT COVID-19 Rapid Screening  -     POCT Influenza A/B      Patient Instructions   Zyrtec, Claritin, or Allegra OTC as directed for the next 7 days  Add a decongestant to your antihistamine for congestion- like Zyrtec-D, Claritin D, Allegra D-this may increase your blood pressure.   If high blood pressure, an alternative decongestant is Coricidin HBP   Flonase OTC as directed for the next 7 days  [place] Nasal Spray OTC 3x/day for the next 7 days   You can try breathe right strips at night to help you breathe.  A cool mist humidifier in bedroom may help with cough and relieve stuffy nose.      Follow up with Primary Care or ENT if not improved in 7-10 Days  Follow up with your PCP or specialty clinic within the week if not improved or as needed.  You can call [phone] to schedule an appointment with the appropriate provider.  You must understand that you've received an Urgent Care treatment only and that you may be released before all your medical problems are known or treated. You, the patient, will arrange for follow up care as instructed.  If your condition worsens we recommend that you receive another evaluation at the emergency room immediately or contact your primary medical  "clinics after hours call service to discuss your concerns.  Please return here or go to the Emergency Department for any concerns or worsening of condition.    Highly Recommend you take Vitamin C & D and Zinc to enhance immune system!    You have tested NEGATIVE for COVID-19 today. If you did not have a close exposure (as defined below) you can return to your normal daily activities to include social distancing, wearing a mask and frequent handwashing. A "close exposure" is defined as anyone who has had an exposure (masked or unmasked) to a known COVID -19 positive person within 6 ft for longer than 15 minutes. If your exposure meets this definition, you are required by CDC guidelines to quarantine for at least 7-10 days from time of exposure. The CDC states that a test can be performed for an asymptomatic patient (someone who does not have any symptoms) after a close exposure, and that a test should be done if you develop symptoms after a close exposure as described above. Specifically, you can test at day 5 or later if asymptomatic in order to get released from quarantine on day 7 or later. If you develop symptoms sooner, you should test when your symptoms start. If you developed symptoms since the exposure, and your test was negative today and less than 5 days from your exposure, you still have to quarantine for 7-10 days from the date of the exposure. The 7-10 day quarantine begins from the day you were exposed, not the day of your test. For example, if your exposure was on a Monday, and you waited until Friday of the same week to get tested and it was negative, your 7-10 day quarantine begins from that Monday, not the Friday you tested negative. Please note, if you decide to test as an asymptomatic during your quarantine and you are positive, you will be restarting your quarantine and moving from a possible 10 day quarantine (if you do not test), to a 11 day or greater quarantine.     Separate yourself from " other people and animals in your home.   Call ahead before visiting your doctor.   Wear a facemask.   Cover your coughs and sneezes.   Wash your hands often with soap and water; hand  can be used, too.   Avoid sharing personal household items.   Wipe down surfaces used daily.   Monitor your symptoms. Seek prompt medical attention if your illness is worsening (e.g., difficulty breathing).    Before seeking care, call your healthcare provider.   If you have a medical emergency and need to call 911, notify the dispatch personnel that you have, or are being evaluated for COVID-19. If possible, put on a facemask before emergency medical services arrive.          Recommended precautions for household members, intimate partners, and caregivers in a home setting of a patient with symptomatic laboratory-confirmed COVID-19 or a patient under investigation.  Household members, intimate partners, and caregivers in the home setting awaiting tests results have close contact with a person with symptomatic, laboratory-confirmed COVID-19 or a person under investigation. Close contacts should monitor their health; they should call their provider right away if they develop symptoms suggestive of COVID-19 (e.g., fever, cough, shortness of breath).    Close contacts should also follow these recommendations:   Make sure that you understand and can help the patient follow their provider's instructions for medication(s) and care. You should help the patient with basic needs in the home and provide support for getting groceries, prescriptions, and other personal needs.   Monitor the patient's symptoms. If the patient is getting sicker, call his or her healthcare provider and tell them that the patient has laboratory-confirmed COVID-19. If the patient has a medical emergency and you need to call 911, notify the dispatch personnel that the patient has, or is being evaluated for COVID-19.   Household members should stay in  another room or be  from the patient. Household members should use a separate bedroom and bathroom, if available.   Prohibit visitors.   Household members should care for any pets in the home.   Make sure that shared spaces in the home have good air flow, such as by an air conditioner or an opened window, weather permitting.   Perform hand hygiene frequently. Wash your hands often with soap and water for at least 20 seconds or use an alcohol-based hand  (that contains > 60% alcohol) covering all surfaces of your hands and rubbing them together until they feel dry. Soap and water should be used preferentially.   Avoid touching your eyes, nose, and mouth.   The patient should wear a facemask. If the patient is not able to wear a facemask (for example, because it causes trouble breathing), caregivers should wear a mask when they are in the same room as the patient.   Wear a disposable facemask and gloves when you touch or have contact with the patient's blood, stool, or body fluids, such as saliva, sputum, nasal mucus, vomit, urine.  o Throw out disposable facemasks and gloves after using them. Do not reuse.  o When removing personal protective equipment, first remove and dispose of gloves. Then, immediately clean your hands with soap and water or alcohol-based hand . Next, remove and dispose of facemask, and immediately clean your hands again with soap and water or alcohol-based hand .   You should not share dishes, drinking glasses, cups, eating utensils, towels, bedding, or other items with the patient. After the patient uses these items, you should wash them thoroughly (see below Wash laundry thoroughly).   Clean all high-touch surfaces, such as counters, tabletops, doorknobs, bathroom fixtures, toilets, phones, keyboards, tablets, and bedside tables, every day. Also, clean any surfaces that may have blood, stool, or body fluids on them.   Use a household cleaning  spray or wipe, according to the label instructions. Labels contain instructions for safe and effective use of the cleaning product including precautions you should take when applying the product, such as wearing gloves and making sure you have good ventilation during use of the product.   Wash laundry thoroughly.  o Immediately remove and wash clothes or bedding that have blood, stool, or body fluids on them.  o Wear disposable gloves while handling soiled items and keep soiled items away from your body. Clean your hands (with soap and water or an alcohol-based hand ) immediately after removing your gloves.  o Read and follow directions on labels of laundry or clothing items and detergent. In general, using a normal laundry detergent according to washing machine instructions and dry thoroughly using the warmest temperatures recommended on the clothing label.   Place all used disposable gloves, facemasks, and other contaminated items in a lined container before disposing of them with other household waste. Clean your hands (with soap and water or an alcohol-based hand ) immediately after handling these items. Soap and water should be used preferentially if hands are visibly dirty.   Discuss any additional questions with your state or local health department or healthcare provider. Check available hours when contacting your local health department.    For more information see CDC link below.      https://www.cdc.gov/coronavirus/2019-ncov/hcp/guidance-prevent-spread.html#precautions        Sources:  CDC, [place] [facility]    If you were prescribed a narcotic or controlled medication, do not drive or operate heavy equipment or machinery while taking these medications.  You must understand that you've received an Urgent Care treatment only and that you may be released before all your medical problems are known or treated. You, the patient, will arrange for follow up care as  instructed.  Follow up with your PCP or specialty clinic as directed within 2-5 days if not improved or as needed.  You can call [phone] to schedule an appointment with the appropriate provider.  If your condition worsens we recommend that you receive another evaluation at the emergency room immediately or contact your primary medical clinics after hours call service to discuss your concerns.  Please return here or go to the Emergency Department for any concerns or worsening of condition.

## 2021-01-04 ENCOUNTER — PATIENT MESSAGE (OUTPATIENT)
Dept: ADMINISTRATIVE | Facility: HOSPITAL | Age: 41
End: 2021-01-04

## 2021-04-05 ENCOUNTER — PATIENT MESSAGE (OUTPATIENT)
Dept: ADMINISTRATIVE | Facility: HOSPITAL | Age: 41
End: 2021-04-05

## 2021-05-04 ENCOUNTER — PATIENT MESSAGE (OUTPATIENT)
Dept: RESEARCH | Facility: HOSPITAL | Age: 41
End: 2021-05-04

## 2021-07-01 ENCOUNTER — PATIENT MESSAGE (OUTPATIENT)
Dept: ADMINISTRATIVE | Facility: OTHER | Age: 41
End: 2021-07-01

## 2021-07-06 ENCOUNTER — PATIENT MESSAGE (OUTPATIENT)
Dept: ADMINISTRATIVE | Facility: HOSPITAL | Age: 41
End: 2021-07-06

## 2021-08-27 ENCOUNTER — CLINICAL SUPPORT (OUTPATIENT)
Dept: OTHER | Facility: CLINIC | Age: 41
End: 2021-08-27
Payer: COMMERCIAL

## 2021-08-27 DIAGNOSIS — Z00.8 ENCOUNTER FOR OTHER GENERAL EXAMINATION: ICD-10-CM

## 2021-08-28 VITALS — HEIGHT: 65 IN | BODY MASS INDEX: 27.12 KG/M2

## 2021-08-28 LAB
GLUCOSE SERPL-MCNC: 91 MG/DL (ref 60–140)
HDLC SERPL-MCNC: 41 MG/DL
POC CHOLESTEROL, LDL (DOCK): 83 MG/DL
POC CHOLESTEROL, TOTAL: 145 MG/DL
TRIGL SERPL-MCNC: 105 MG/DL

## 2021-09-15 ENCOUNTER — TELEPHONE (OUTPATIENT)
Dept: ADMINISTRATIVE | Facility: OTHER | Age: 41
End: 2021-09-15

## 2021-10-05 ENCOUNTER — PATIENT MESSAGE (OUTPATIENT)
Dept: ADMINISTRATIVE | Facility: HOSPITAL | Age: 41
End: 2021-10-05

## 2021-10-20 ENCOUNTER — OFFICE VISIT (OUTPATIENT)
Dept: FAMILY MEDICINE | Facility: CLINIC | Age: 41
End: 2021-10-20
Payer: COMMERCIAL

## 2021-10-20 VITALS
RESPIRATION RATE: 18 BRPM | BODY MASS INDEX: 29.41 KG/M2 | HEART RATE: 64 BPM | SYSTOLIC BLOOD PRESSURE: 140 MMHG | DIASTOLIC BLOOD PRESSURE: 80 MMHG | WEIGHT: 176.5 LBS | HEIGHT: 65 IN | OXYGEN SATURATION: 98 % | TEMPERATURE: 98 F

## 2021-10-20 DIAGNOSIS — R03.0 ELEVATED BP WITHOUT DIAGNOSIS OF HYPERTENSION: ICD-10-CM

## 2021-10-20 DIAGNOSIS — Z11.59 ENCOUNTER FOR HEPATITIS C SCREENING TEST FOR LOW RISK PATIENT: ICD-10-CM

## 2021-10-20 DIAGNOSIS — F17.200 CURRENT SMOKER: ICD-10-CM

## 2021-10-20 DIAGNOSIS — Z13.220 ENCOUNTER FOR SCREENING FOR LIPID DISORDER: ICD-10-CM

## 2021-10-20 DIAGNOSIS — Z11.4 ENCOUNTER FOR SCREENING FOR HIV: ICD-10-CM

## 2021-10-20 DIAGNOSIS — Z13.1 SCREENING FOR DIABETES MELLITUS: ICD-10-CM

## 2021-10-20 DIAGNOSIS — Z12.31 ENCOUNTER FOR SCREENING MAMMOGRAM FOR MALIGNANT NEOPLASM OF BREAST: ICD-10-CM

## 2021-10-20 DIAGNOSIS — R53.82 CHRONIC FATIGUE: ICD-10-CM

## 2021-10-20 DIAGNOSIS — Z13.0 SCREENING FOR BLOOD DISEASE: ICD-10-CM

## 2021-10-20 DIAGNOSIS — Z13.29 SCREENING FOR THYROID DISORDER: ICD-10-CM

## 2021-10-20 DIAGNOSIS — Z00.00 ANNUAL PHYSICAL EXAM: Primary | ICD-10-CM

## 2021-10-20 PROCEDURE — 3008F PR BODY MASS INDEX (BMI) DOCUMENTED: ICD-10-PCS | Mod: CPTII,S$GLB,, | Performed by: STUDENT IN AN ORGANIZED HEALTH CARE EDUCATION/TRAINING PROGRAM

## 2021-10-20 PROCEDURE — 99999 PR PBB SHADOW E&M-EST. PATIENT-LVL IV: ICD-10-PCS | Mod: PBBFAC,,, | Performed by: STUDENT IN AN ORGANIZED HEALTH CARE EDUCATION/TRAINING PROGRAM

## 2021-10-20 PROCEDURE — 3079F PR MOST RECENT DIASTOLIC BLOOD PRESSURE 80-89 MM HG: ICD-10-PCS | Mod: CPTII,S$GLB,, | Performed by: STUDENT IN AN ORGANIZED HEALTH CARE EDUCATION/TRAINING PROGRAM

## 2021-10-20 PROCEDURE — 1159F MED LIST DOCD IN RCRD: CPT | Mod: CPTII,S$GLB,, | Performed by: STUDENT IN AN ORGANIZED HEALTH CARE EDUCATION/TRAINING PROGRAM

## 2021-10-20 PROCEDURE — 99999 PR PBB SHADOW E&M-EST. PATIENT-LVL IV: CPT | Mod: PBBFAC,,, | Performed by: STUDENT IN AN ORGANIZED HEALTH CARE EDUCATION/TRAINING PROGRAM

## 2021-10-20 PROCEDURE — 3077F PR MOST RECENT SYSTOLIC BLOOD PRESSURE >= 140 MM HG: ICD-10-PCS | Mod: CPTII,S$GLB,, | Performed by: STUDENT IN AN ORGANIZED HEALTH CARE EDUCATION/TRAINING PROGRAM

## 2021-10-20 PROCEDURE — 3008F BODY MASS INDEX DOCD: CPT | Mod: CPTII,S$GLB,, | Performed by: STUDENT IN AN ORGANIZED HEALTH CARE EDUCATION/TRAINING PROGRAM

## 2021-10-20 PROCEDURE — 1160F PR REVIEW ALL MEDS BY PRESCRIBER/CLIN PHARMACIST DOCUMENTED: ICD-10-PCS | Mod: CPTII,S$GLB,, | Performed by: STUDENT IN AN ORGANIZED HEALTH CARE EDUCATION/TRAINING PROGRAM

## 2021-10-20 PROCEDURE — 99386 PREV VISIT NEW AGE 40-64: CPT | Mod: 25,S$GLB,, | Performed by: STUDENT IN AN ORGANIZED HEALTH CARE EDUCATION/TRAINING PROGRAM

## 2021-10-20 PROCEDURE — 3079F DIAST BP 80-89 MM HG: CPT | Mod: CPTII,S$GLB,, | Performed by: STUDENT IN AN ORGANIZED HEALTH CARE EDUCATION/TRAINING PROGRAM

## 2021-10-20 PROCEDURE — 99386 PR PREVENTIVE VISIT,NEW,40-64: ICD-10-PCS | Mod: 25,S$GLB,, | Performed by: STUDENT IN AN ORGANIZED HEALTH CARE EDUCATION/TRAINING PROGRAM

## 2021-10-20 PROCEDURE — 3077F SYST BP >= 140 MM HG: CPT | Mod: CPTII,S$GLB,, | Performed by: STUDENT IN AN ORGANIZED HEALTH CARE EDUCATION/TRAINING PROGRAM

## 2021-10-20 PROCEDURE — 1159F PR MEDICATION LIST DOCUMENTED IN MEDICAL RECORD: ICD-10-PCS | Mod: CPTII,S$GLB,, | Performed by: STUDENT IN AN ORGANIZED HEALTH CARE EDUCATION/TRAINING PROGRAM

## 2021-10-20 PROCEDURE — 1160F RVW MEDS BY RX/DR IN RCRD: CPT | Mod: CPTII,S$GLB,, | Performed by: STUDENT IN AN ORGANIZED HEALTH CARE EDUCATION/TRAINING PROGRAM

## 2021-10-21 RX ORDER — FERROUS GLUCONATE 324(37.5)
324 TABLET ORAL
Qty: 90 TABLET | Refills: 3
Start: 2021-10-21 | End: 2022-12-08

## 2021-10-21 RX ORDER — ICOSAPENT ETHYL 1000 MG/1
2 CAPSULE ORAL 2 TIMES DAILY
Qty: 60 CAPSULE | Refills: 6 | Status: SHIPPED | OUTPATIENT
Start: 2021-10-21

## 2021-10-23 ENCOUNTER — PATIENT MESSAGE (OUTPATIENT)
Dept: FAMILY MEDICINE | Facility: CLINIC | Age: 41
End: 2021-10-23
Payer: COMMERCIAL

## 2022-01-10 ENCOUNTER — PATIENT MESSAGE (OUTPATIENT)
Dept: ADMINISTRATIVE | Facility: HOSPITAL | Age: 42
End: 2022-01-10
Payer: COMMERCIAL

## 2022-05-31 ENCOUNTER — PATIENT MESSAGE (OUTPATIENT)
Dept: ADMINISTRATIVE | Facility: HOSPITAL | Age: 42
End: 2022-05-31

## 2022-08-24 ENCOUNTER — PATIENT MESSAGE (OUTPATIENT)
Dept: ADMINISTRATIVE | Facility: HOSPITAL | Age: 42
End: 2022-08-24
Payer: COMMERCIAL

## 2022-10-10 ENCOUNTER — PATIENT MESSAGE (OUTPATIENT)
Dept: ADMINISTRATIVE | Facility: HOSPITAL | Age: 42
End: 2022-10-10
Payer: COMMERCIAL

## 2022-10-25 ENCOUNTER — CLINICAL SUPPORT (OUTPATIENT)
Dept: OTHER | Facility: CLINIC | Age: 42
End: 2022-10-25
Payer: COMMERCIAL

## 2022-10-25 DIAGNOSIS — Z00.8 ENCOUNTER FOR OTHER GENERAL EXAMINATION: ICD-10-CM

## 2022-10-26 DIAGNOSIS — Z12.31 OTHER SCREENING MAMMOGRAM: ICD-10-CM

## 2022-10-26 LAB
GLUCOSE SERPL-MCNC: 82 MG/DL (ref 60–140)
HDLC SERPL-MCNC: 37 MG/DL
POC CHOLESTEROL, LDL (DOCK): 81 MG/DL
POC CHOLESTEROL, TOTAL: 143 MG/DL
TRIGL SERPL-MCNC: 144 MG/DL

## 2022-10-29 VITALS
BODY MASS INDEX: 26.2 KG/M2 | HEIGHT: 66 IN | SYSTOLIC BLOOD PRESSURE: 140 MMHG | WEIGHT: 163 LBS | DIASTOLIC BLOOD PRESSURE: 90 MMHG

## 2022-10-31 ENCOUNTER — PATIENT MESSAGE (OUTPATIENT)
Dept: ADMINISTRATIVE | Facility: HOSPITAL | Age: 42
End: 2022-10-31
Payer: COMMERCIAL

## 2022-12-08 ENCOUNTER — OFFICE VISIT (OUTPATIENT)
Dept: OBSTETRICS AND GYNECOLOGY | Facility: CLINIC | Age: 42
End: 2022-12-08
Payer: COMMERCIAL

## 2022-12-08 VITALS — BODY MASS INDEX: 27.53 KG/M2 | DIASTOLIC BLOOD PRESSURE: 80 MMHG | WEIGHT: 168 LBS | SYSTOLIC BLOOD PRESSURE: 140 MMHG

## 2022-12-08 DIAGNOSIS — N64.52 NIPPLE DISCHARGE IN FEMALE: ICD-10-CM

## 2022-12-08 DIAGNOSIS — N92.6 IRREGULAR BLEEDING: Primary | ICD-10-CM

## 2022-12-08 DIAGNOSIS — R10.2 PELVIC PAIN IN FEMALE: ICD-10-CM

## 2022-12-08 PROCEDURE — 99203 OFFICE O/P NEW LOW 30 MIN: CPT | Mod: S$GLB,,, | Performed by: OBSTETRICS & GYNECOLOGY

## 2022-12-08 PROCEDURE — 3008F BODY MASS INDEX DOCD: CPT | Mod: CPTII,S$GLB,, | Performed by: OBSTETRICS & GYNECOLOGY

## 2022-12-08 PROCEDURE — 3008F PR BODY MASS INDEX (BMI) DOCUMENTED: ICD-10-PCS | Mod: CPTII,S$GLB,, | Performed by: OBSTETRICS & GYNECOLOGY

## 2022-12-08 PROCEDURE — 3077F SYST BP >= 140 MM HG: CPT | Mod: CPTII,S$GLB,, | Performed by: OBSTETRICS & GYNECOLOGY

## 2022-12-08 PROCEDURE — 99999 PR PBB SHADOW E&M-EST. PATIENT-LVL III: CPT | Mod: PBBFAC,,, | Performed by: OBSTETRICS & GYNECOLOGY

## 2022-12-08 PROCEDURE — 3079F DIAST BP 80-89 MM HG: CPT | Mod: CPTII,S$GLB,, | Performed by: OBSTETRICS & GYNECOLOGY

## 2022-12-08 PROCEDURE — 3079F PR MOST RECENT DIASTOLIC BLOOD PRESSURE 80-89 MM HG: ICD-10-PCS | Mod: CPTII,S$GLB,, | Performed by: OBSTETRICS & GYNECOLOGY

## 2022-12-08 PROCEDURE — 99203 PR OFFICE/OUTPT VISIT, NEW, LEVL III, 30-44 MIN: ICD-10-PCS | Mod: S$GLB,,, | Performed by: OBSTETRICS & GYNECOLOGY

## 2022-12-08 PROCEDURE — 1159F PR MEDICATION LIST DOCUMENTED IN MEDICAL RECORD: ICD-10-PCS | Mod: CPTII,S$GLB,, | Performed by: OBSTETRICS & GYNECOLOGY

## 2022-12-08 PROCEDURE — 3077F PR MOST RECENT SYSTOLIC BLOOD PRESSURE >= 140 MM HG: ICD-10-PCS | Mod: CPTII,S$GLB,, | Performed by: OBSTETRICS & GYNECOLOGY

## 2022-12-08 PROCEDURE — 99999 PR PBB SHADOW E&M-EST. PATIENT-LVL III: ICD-10-PCS | Mod: PBBFAC,,, | Performed by: OBSTETRICS & GYNECOLOGY

## 2022-12-08 PROCEDURE — 1159F MED LIST DOCD IN RCRD: CPT | Mod: CPTII,S$GLB,, | Performed by: OBSTETRICS & GYNECOLOGY

## 2022-12-08 RX ORDER — MEDROXYPROGESTERONE ACETATE 10 MG/1
TABLET ORAL
Qty: 10 TABLET | Refills: 0 | Status: SHIPPED | OUTPATIENT
Start: 2022-12-08 | End: 2022-12-27 | Stop reason: SDUPTHER

## 2022-12-08 NOTE — PROGRESS NOTES
GYNECOLOGY OFFICE NOTE    Reason for visit: annual    HPI: Pt is a 42 y.o.  female  who presents for annual. Menarche: 8. Cycle: Interval-  Q month, until the last 2 years- now come ~ twice a month/  Duration-  8 days, Flow- heavy the first 3 days (changing tampon and pad every 2 hours), reports dysmenorrhea. She is sexually active but has vaginal pain with intercourse.  She uses bilateral tubal ligation for contraception.  She does not desire STI screening. She denies vaginal discharge.  Last pap: ~ 4 year denies hx of abnormal. Last MMG - negative. Discussed possible etiology of irregular pelvic pain/bleeding. Discussed recommendation for embx and discussed some treatment options.     Past Medical History:   Diagnosis Date    ADD (attention deficit disorder)     Anxiety     Deaf        Past Surgical History:   Procedure Laterality Date     SECTION      x 2    CHOLECYSTECTOMY      TUBAL LIGATION      at time of second C/D       Family History   Problem Relation Age of Onset    Hypertension Mother     Stroke Mother     COPD Mother     Heart disease Father     Cancer Father         carcinoid    Hypertension Father     Hypertension Sister     Hypertension Sister     Hypertension Sister     Breast cancer Maternal Grandmother     Ovarian cancer Paternal Grandmother        Social History     Tobacco Use    Smoking status: Every Day     Packs/day: 0.50     Years: 0.00     Pack years: 0.00     Types: Cigarettes    Smokeless tobacco: Never   Substance Use Topics    Alcohol use: Yes     Alcohol/week: 4.0 standard drinks     Types: 4 Cans of beer per week     Comment: weekends       OB History    Para Term  AB Living   2 2 2     2   SAB IAB Ectopic Multiple Live Births           2      # Outcome Date GA Lbr Waylon/2nd Weight Sex Delivery Anes PTL Lv   2 Term     M CS-LTranv   GAVIN   1 Term     F CS-LTranv   GAVIN       Current Outpatient Medications   Medication Sig    ibuprofen  (ADVIL,MOTRIN) 600 MG tablet Take 1 tablet (600 mg total) by mouth every 6 (six) hours as needed. (Patient not taking: Reported on 12/8/2022)    icosapent ethyL (VASCEPA) 1 gram Cap Take 2 capsules (2 g total) by mouth 2 (two) times daily. (Patient not taking: Reported on 12/8/2022)    medroxyPROGESTERone (PROVERA) 10 MG tablet Take two tablets three times a day for 7 days followed by two tablets once a day for 21 days     No current facility-administered medications for this visit.       Allergies: Patient has no known allergies.     BP (!) 140/80   Wt 76.2 kg (167 lb 15.9 oz)   LMP 12/08/2022 (Exact Date)   BMI 27.53 kg/m²     ROS:  GENERAL: Denies fever or chills.   SKIN: Denies rash or lesions.   HEAD: Denies head injury or headache.   CHEST: Denies chest pain or shortness of breath.   CARDIOVASCULAR: Denies palpitations or chest pain.   ABDOMEN: No constipation, diarrhea, nausea, vomiting or rectal bleeding.   URINARY: No dysuria, hematuria, or burning on urination.  REPRODUCTIVE: See HPI.   BREASTS: see HPI  NEUROLOGIC: Denies syncope or weakness.     Physical Exam:  GENERAL: alert, appears stated age and cooperative  NEUROLOGIC: orientated to person, place and time, normal mood and affect   CHEST: Normal respiratory effort  NECK: normal appearance  SKIN: no acne, hirsutism  BREAST EXAM: patient declines to have breast exam  ABDOMEN: abdomen is soft without significant tenderness, masses  PELVIC: exam deferred per pt request just started first day of cycle    Diagnosis:  1. Irregular bleeding    2. Pelvic pain in female    3. Nipple discharge in female        Plan:   1/2. Pelvic US ordered. Considering IUD vs endometrial ablation. Provera to stop acute bleeding. Has knee surgery scheduled for 12/20/2022. Will let me know what she desires after US and will return once bleeding stops for full exam +/- EMBX  3. Order for labs placed.     Orders Placed This Encounter    US Pelvis Comp with Transvag NON-OB (xpd     Prolactin    CBC Auto Differential    TSH    medroxyPROGESTERone (PROVERA) 10 MG tablet           Face to Face time with patient: 30 minutes of total time spent on the encounter, which includes face to face time and non-face to face time preparing to see the patient (eg, review of tests), Obtaining and/or reviewing separately obtained history, Documenting clinical information in the electronic or other health record, Independently interpreting results (not separately reported) and communicating results to the patient/family/caregiver, or Care coordination (not separately reported).         Emely Spencer MD  OB/GYN

## 2022-12-21 ENCOUNTER — TELEPHONE (OUTPATIENT)
Dept: OBSTETRICS AND GYNECOLOGY | Facility: CLINIC | Age: 42
End: 2022-12-21
Payer: COMMERCIAL

## 2022-12-21 NOTE — TELEPHONE ENCOUNTER
----- Message from Deedee Pool MA sent at 12/14/2022  6:46 PM CST -----    ----- Message -----  From: Aldo Baker  Sent: 12/14/2022  10:53 AM CST  To: [facility] Staff    Type:  Pharmacy Calling to Clarify an RX    Name of Caller:Victor Hugo  [facility] Name[facility]  Prescription Name:medroxyPROGESTERone (PROVERA) 10 MG tablet  What do they need to clarify?:Take two tablets three times a day for 7 days followed by two tablets once a day for 21 days  Best Call Back Number:[phone]  Additional Information: Pharmacy states its only 10 pills and clarify dictations and quantity

## 2022-12-27 RX ORDER — MEDROXYPROGESTERONE ACETATE 10 MG/1
TABLET ORAL
Qty: 84 TABLET | Refills: 0 | Status: SHIPPED | OUTPATIENT
Start: 2022-12-27 | End: 2023-04-06

## 2023-04-06 ENCOUNTER — OFFICE VISIT (OUTPATIENT)
Dept: OBSTETRICS AND GYNECOLOGY | Facility: CLINIC | Age: 43
End: 2023-04-06
Payer: COMMERCIAL

## 2023-04-06 VITALS
DIASTOLIC BLOOD PRESSURE: 92 MMHG | BODY MASS INDEX: 28.19 KG/M2 | WEIGHT: 169.38 LBS | SYSTOLIC BLOOD PRESSURE: 138 MMHG

## 2023-04-06 DIAGNOSIS — Z12.4 SCREENING FOR CERVICAL CANCER: Primary | ICD-10-CM

## 2023-04-06 DIAGNOSIS — Z11.3 SCREENING FOR STD (SEXUALLY TRANSMITTED DISEASE): ICD-10-CM

## 2023-04-06 DIAGNOSIS — D25.9 UTERINE LEIOMYOMA, UNSPECIFIED LOCATION: ICD-10-CM

## 2023-04-06 DIAGNOSIS — Z12.31 ENCOUNTER FOR SCREENING MAMMOGRAM FOR BREAST CANCER: ICD-10-CM

## 2023-04-06 PROCEDURE — 87591 N.GONORRHOEAE DNA AMP PROB: CPT | Performed by: OBSTETRICS & GYNECOLOGY

## 2023-04-06 PROCEDURE — 87624 HPV HI-RISK TYP POOLED RSLT: CPT | Performed by: OBSTETRICS & GYNECOLOGY

## 2023-04-06 PROCEDURE — 88175 CYTOPATH C/V AUTO FLUID REDO: CPT | Performed by: OBSTETRICS & GYNECOLOGY

## 2023-04-06 PROCEDURE — 1159F MED LIST DOCD IN RCRD: CPT | Mod: CPTII,S$GLB,, | Performed by: OBSTETRICS & GYNECOLOGY

## 2023-04-06 PROCEDURE — 3075F PR MOST RECENT SYSTOLIC BLOOD PRESS GE 130-139MM HG: ICD-10-PCS | Mod: CPTII,S$GLB,, | Performed by: OBSTETRICS & GYNECOLOGY

## 2023-04-06 PROCEDURE — 3008F PR BODY MASS INDEX (BMI) DOCUMENTED: ICD-10-PCS | Mod: CPTII,S$GLB,, | Performed by: OBSTETRICS & GYNECOLOGY

## 2023-04-06 PROCEDURE — 1159F PR MEDICATION LIST DOCUMENTED IN MEDICAL RECORD: ICD-10-PCS | Mod: CPTII,S$GLB,, | Performed by: OBSTETRICS & GYNECOLOGY

## 2023-04-06 PROCEDURE — 58100 BIOPSY OF UTERUS LINING: CPT | Mod: S$GLB,,, | Performed by: OBSTETRICS & GYNECOLOGY

## 2023-04-06 PROCEDURE — 88305 TISSUE EXAM BY PATHOLOGIST: CPT | Mod: 26,,, | Performed by: PATHOLOGY

## 2023-04-06 PROCEDURE — 99214 OFFICE O/P EST MOD 30 MIN: CPT | Mod: 25,S$GLB,, | Performed by: OBSTETRICS & GYNECOLOGY

## 2023-04-06 PROCEDURE — 3080F DIAST BP >= 90 MM HG: CPT | Mod: CPTII,S$GLB,, | Performed by: OBSTETRICS & GYNECOLOGY

## 2023-04-06 PROCEDURE — 3080F PR MOST RECENT DIASTOLIC BLOOD PRESSURE >= 90 MM HG: ICD-10-PCS | Mod: CPTII,S$GLB,, | Performed by: OBSTETRICS & GYNECOLOGY

## 2023-04-06 PROCEDURE — 88305 TISSUE EXAM BY PATHOLOGIST: CPT | Performed by: PATHOLOGY

## 2023-04-06 PROCEDURE — 3008F BODY MASS INDEX DOCD: CPT | Mod: CPTII,S$GLB,, | Performed by: OBSTETRICS & GYNECOLOGY

## 2023-04-06 PROCEDURE — 3075F SYST BP GE 130 - 139MM HG: CPT | Mod: CPTII,S$GLB,, | Performed by: OBSTETRICS & GYNECOLOGY

## 2023-04-06 PROCEDURE — 99214 PR OFFICE/OUTPT VISIT, EST, LEVL IV, 30-39 MIN: ICD-10-PCS | Mod: 25,S$GLB,, | Performed by: OBSTETRICS & GYNECOLOGY

## 2023-04-06 PROCEDURE — 81514 NFCT DS BV&VAGINITIS DNA ALG: CPT | Performed by: OBSTETRICS & GYNECOLOGY

## 2023-04-06 PROCEDURE — 99999 PR PBB SHADOW E&M-EST. PATIENT-LVL III: ICD-10-PCS | Mod: PBBFAC,,, | Performed by: OBSTETRICS & GYNECOLOGY

## 2023-04-06 PROCEDURE — 58100 ENDOMETRIAL BIOPSY: ICD-10-PCS | Mod: S$GLB,,, | Performed by: OBSTETRICS & GYNECOLOGY

## 2023-04-06 PROCEDURE — 88305 TISSUE EXAM BY PATHOLOGIST: ICD-10-PCS | Mod: 26,,, | Performed by: PATHOLOGY

## 2023-04-06 PROCEDURE — 99999 PR PBB SHADOW E&M-EST. PATIENT-LVL III: CPT | Mod: PBBFAC,,, | Performed by: OBSTETRICS & GYNECOLOGY

## 2023-04-06 RX ORDER — ONDANSETRON 4 MG/1
4 TABLET, FILM COATED ORAL EVERY 8 HOURS
COMMUNITY
Start: 2022-12-20

## 2023-04-06 RX ORDER — MEDROXYPROGESTERONE ACETATE 10 MG/1
20 TABLET ORAL DAILY
Qty: 60 TABLET | Refills: 4 | Status: SHIPPED | OUTPATIENT
Start: 2023-04-06 | End: 2024-04-05

## 2023-04-06 RX ORDER — CELECOXIB 200 MG/1
200 CAPSULE ORAL 2 TIMES DAILY WITH MEALS
COMMUNITY
Start: 2022-11-03

## 2023-04-06 RX ORDER — ASPIRIN 81 MG/1
81 TABLET ORAL 2 TIMES DAILY
COMMUNITY
Start: 2022-12-20

## 2023-04-06 RX ORDER — TRAMADOL HYDROCHLORIDE 50 MG/1
50 TABLET ORAL EVERY 8 HOURS PRN
COMMUNITY
Start: 2022-11-02

## 2023-04-06 NOTE — PROCEDURES
Endometrial biopsy    Date/Time: 4/6/2023 8:15 AM  Performed by: Emely Spencer MD  Authorized by: Emely Spencer MD     Consent:     Consent obtained:  Written    Consent given by:  Patient    Patient questions answered: yes      Patient agrees, verbalizes understanding, and wants to proceed: yes      Educational handouts given: yes      Instructions and paperwork completed: yes    Indication:     Indications: Menorrhagia    Pre-procedure:     Pre-procedure timeout performed: yes    Procedure:     Procedure: endometrial biopsy with Pipelle      Cervix cleaned and prepped: yes      A paracervical block was performed: no      An intracervical block was performed: no      The cervix was dilated: no      Uterus sounded: yes      Uterus sound depth (cm):  11    Specimen collected: specimen collected and sent to pathology      Patient tolerated procedure well with no complications: yes      Emely Spencer MD, FACOG  OB/GYN

## 2023-04-06 NOTE — PROGRESS NOTES
GYNECOLOGY OFFICE NOTE    Reason for visit: pap and evaluation of irregular bleeding    HPI: Pt is a 42 y.o.  female  who presents for  pap and evaluation of irregular bleeding. Previously reported: Menarche: 8. Cycle: Interval-  Q month, until the last 2 years- now come ~ twice a month/  Duration-  8 days, Flow- heavy the first 3 days (changing tampon and pad every 2 hours), reports dysmenorrhea. Pelvic US obtained and report below. She is sexually active.  She uses bilateral tubal ligation for contraception.  Reports continued irregular bleeding. Was not able to get provera prescription from Dec. Reports continued back pain and pelvic pressure. We discussed treatment options for fibroids including low dose oral contraceptive pills, Mirena (bleeding), Oriahnn, DepoLupron, uterine fibroid embolization, uterine fibroid ablation, robotic or abdominal myomectomy, and hysterectomy.  Patient desires a hysterectomy. EMBX to be performed today.       Pelvic US:2022  Uterus:Size: Enlarged and measures 15 x 7.9 x 7.9 cm  Masses: Multiple fibroids are seen.  The largest measures 6.5 cm and is located in the posterior mid body, subserosal in location.  Posterior fundal fibroid is the 2nd largest fibroid measures 4 cm and is also subserosal in location.  Third fibroid is seen measuring 3.5 cm in the posterior upper body in close proximity to the fundus.  This closely approximates the stripe.  Endometrium: Normal in this pre menopausal patient, measuring 4 mm.  It is best visualized transabdominally.  Right ovary:Could not be visualized  Left ovary:Size: 3.5 x 1.3 x 2.8 cm Appearance: Normal Vascular Flow: Normal.  Free Fluid:None.  Impression:Leiomyomatous uterus as above.  Right ovary could not be visualized.       Past Medical History:   Diagnosis Date    ADD (attention deficit disorder)     Anxiety     Deaf        Past Surgical History:   Procedure Laterality Date     SECTION      x 2     CHOLECYSTECTOMY      TUBAL LIGATION      at time of second C/D       Family History   Problem Relation Age of Onset    Hypertension Mother     Stroke Mother     COPD Mother     Heart disease Father     Cancer Father         carcinoid    Hypertension Father     Hypertension Sister     Hypertension Sister     Hypertension Sister     Breast cancer Maternal Grandmother     Ovarian cancer Paternal Grandmother        Social History     Tobacco Use    Smoking status: Every Day     Packs/day: 0.50     Years: 0.00     Pack years: 0.00     Types: Cigarettes    Smokeless tobacco: Never   Substance Use Topics    Alcohol use: Yes     Alcohol/week: 4.0 standard drinks     Types: 4 Cans of beer per week     Comment: weekends       OB History    Para Term  AB Living   2 2 2     2   SAB IAB Ectopic Multiple Live Births           2      # Outcome Date GA Lbr Waylon/2nd Weight Sex Delivery Anes PTL Lv   2 Term     M CS-LTranv   GAVIN   1 Term     F CS-LTranv   GAVIN       Current Outpatient Medications   Medication Sig    aspirin (ECOTRIN) 81 MG EC tablet Take 81 mg by mouth 2 (two) times daily.    celecoxib (CELEBREX) 200 MG capsule Take 200 mg by mouth 2 (two) times daily with meals.    ibuprofen (ADVIL,MOTRIN) 600 MG tablet Take 1 tablet (600 mg total) by mouth every 6 (six) hours as needed. (Patient not taking: Reported on 2023)    ibuprofen (ADVIL,MOTRIN) 800 MG tablet Take 1 tablet (800 mg total) by mouth every 8 (eight) hours as needed for Pain. (Patient not taking: Reported on 2023)    icosapent ethyL (VASCEPA) 1 gram Cap Take 2 capsules (2 g total) by mouth 2 (two) times daily. (Patient not taking: Reported on 2022)    medroxyPROGESTERone (PROVERA) 10 MG tablet Take 2 tablets (20 mg total) by mouth once daily.    ondansetron (ZOFRAN) 4 MG tablet Take 4 mg by mouth every 8 (eight) hours.    traMADoL (ULTRAM) 50 mg tablet Take 50 mg by mouth every 8 (eight) hours as needed.     No current  facility-administered medications for this visit.       Allergies: Patient has no known allergies.     BP (!) 138/92   Wt 76.8 kg (169 lb 6.4 oz)   LMP 04/02/2023 Comment: Pt states she bleeds all the time  BMI 28.19 kg/m²     ROS:  GENERAL: Denies fever or chills.   SKIN: Denies rash or lesions.   HEAD: Denies head injury or headache.   CHEST: Denies chest pain or shortness of breath.   CARDIOVASCULAR: Denies palpitations or chest pain.   ABDOMEN: No constipation, diarrhea, nausea, vomiting or rectal bleeding.   URINARY: No dysuria, hematuria, or burning on urination.  REPRODUCTIVE: See HPI.   BREASTS: see HPI  NEUROLOGIC: Denies syncope or weakness.     Physical Exam:  GENERAL: alert, appears stated age and cooperative  NEUROLOGIC: orientated to person, place and time, normal mood and affect   CHEST: Normal respiratory effort  NECK: normal appearance  SKIN: no acne, hirsutism  BREAST EXAM: breasts appear normal, no suspicious masses, no skin or nipple changes or axillary nodes  ABDOMEN: abdomen is soft without significant tenderness, masses  EXTERNAL GENITALIA:  normal general appearance  URETHRA: normal urethra, normal urethral meatus  VAGINA:  normal mucosa, no  lesions  CERVIX:  Normal  UTERUS:  enlarged size, mobile  ADNEXA: nontender    Diagnosis:  1. Screening for cervical cancer    2. Encounter for screening mammogram for breast cancer    3. Uterine leiomyoma, unspecified location    4. Screening for STD (sexually transmitted disease)        Plan:   1. Pap/hpv today  2. MMG for Dec  3. EMBX today and referral placed for scheduling hysterectomy. Refill on daily provera sent  4. F/u gc/ct and affirm    Orders Placed This Encounter    Endometrial biopsy    HPV High Risk Genotypes, PCR    VAGINOSIS SCREEN BY DNA PROBE    C. trachomatis/N. gonorrhoeae by AMP DNA [facility]; Cervix    Mammo Digital Screening Bilat w/ Ronen    CBC Auto Differential    BASIC METABOLIC PANEL    Liquid-Based Pap Smear, Screening     Specimen to Pathology, Ob/Gyn    medroxyPROGESTERone (PROVERA) 10 MG tablet           Face to Face time with patient: 30 minutes of total time spent on the encounter, which includes face to face time and non-face to face time preparing to see the patient (eg, review of tests), Obtaining and/or reviewing separately obtained history, Documenting clinical information in the electronic or other health record, Independently interpreting results (not separately reported) and communicating results to the patient/family/caregiver, or Care coordination (not separately reported).         Emely pSencer MD  OB/GYN

## 2023-04-07 LAB
BACTERIAL VAGINOSIS DNA: POSITIVE
CANDIDA GLABRATA DNA: NEGATIVE
CANDIDA KRUSEI DNA: NEGATIVE
CANDIDA RRNA VAG QL PROBE: NEGATIVE
T VAGINALIS RRNA GENITAL QL PROBE: NEGATIVE

## 2023-04-10 LAB
C TRACH DNA SPEC QL NAA+PROBE: NOT DETECTED
N GONORRHOEA DNA SPEC QL NAA+PROBE: NOT DETECTED

## 2023-04-10 RX ORDER — METRONIDAZOLE 500 MG/1
500 TABLET ORAL EVERY 12 HOURS
Qty: 14 TABLET | Refills: 0 | Status: SHIPPED | OUTPATIENT
Start: 2023-04-10 | End: 2023-04-17

## 2023-04-11 ENCOUNTER — TELEPHONE (OUTPATIENT)
Dept: OBSTETRICS AND GYNECOLOGY | Facility: CLINIC | Age: 43
End: 2023-04-11
Payer: COMMERCIAL

## 2023-04-13 LAB
CLINICAL INFO: NORMAL
CYTO CVX: NORMAL
CYTOLOGIST CVX/VAG CYTO: NORMAL
CYTOLOGIST CVX/VAG CYTO: NORMAL
CYTOLOGY CMNT CVX/VAG CYTO-IMP: NORMAL
CYTOLOGY PAP THIN PREP EXPLANATION: NORMAL
DATE OF PREVIOUS PAP: NO
DATE PREVIOUS BX: NO
GEN CATEG CVX/VAG CYTO-IMP: NORMAL
HPV I/H RISK 4 DNA CVX QL NAA+PROBE: NOT DETECTED
LMP START DATE: NORMAL
MICROORGANISM CVX/VAG CYTO: NORMAL
PATHOLOGIST CVX/VAG CYTO: NORMAL
SERVICE CMNT-IMP: NORMAL
SPECIMEN SOURCE CVX/VAG CYTO: NORMAL
STAT OF ADQ CVX/VAG CYTO-IMP: NORMAL

## 2023-04-17 LAB
FINAL PATHOLOGIC DIAGNOSIS: NORMAL
GROSS: NORMAL
Lab: NORMAL

## 2023-04-19 ENCOUNTER — TELEPHONE (OUTPATIENT)
Dept: FAMILY MEDICINE | Facility: CLINIC | Age: 43
End: 2023-04-19
Payer: COMMERCIAL

## 2023-04-19 NOTE — TELEPHONE ENCOUNTER
----- Message from Tera Shah sent at 4/19/2023  3:01 PM CDT -----  Contact: pt.  .Type:  Needs Medical Advice    Who Called: pt  Symptoms (please be specific):  over eating   How long has patient had these symptoms:    Pharmacy name and phone #:  [id number] - [place], [place] - [address] AT [facility] [address]   Phone: [phone]  Fax:  [phone]  Would the patient rather a call back or a response via [facility]?  Call back  Best Call Back Number: [phone]   Additional Information:  Pt. Is requesting something be called in for appetite suppression because since she has been taking medroxyPROGESTERone (PROVERA) 10 MG tablet.  Pt. States she is eating to much .

## 2023-04-19 NOTE — TELEPHONE ENCOUNTER
----- Message from Beny Poole sent at 4/19/2023  5:22 PM CDT -----  .Type:  Needs Medical Advice    Who Called: pt    Would the patient rather a call back or a response via [facility]? Call back  Best Call Back Number: [phone]  Additional Information:     Pt stated she missed a call and would like a call back please

## 2023-04-19 NOTE — TELEPHONE ENCOUNTER
Spoke with pt and she states that since she has been on Provera she has been over eating with medication. Pt reports that she is having a hysterectomy in July and has to be on provera til then. Pt is requesting something to suppress her appetite.

## 2023-04-24 ENCOUNTER — OFFICE VISIT (OUTPATIENT)
Dept: FAMILY MEDICINE | Facility: CLINIC | Age: 43
End: 2023-04-24
Payer: COMMERCIAL

## 2023-04-24 VITALS
BODY MASS INDEX: 28.96 KG/M2 | DIASTOLIC BLOOD PRESSURE: 72 MMHG | OXYGEN SATURATION: 97 % | WEIGHT: 174 LBS | HEART RATE: 83 BPM | SYSTOLIC BLOOD PRESSURE: 128 MMHG

## 2023-04-24 DIAGNOSIS — R13.10 DYSPHAGIA, UNSPECIFIED TYPE: ICD-10-CM

## 2023-04-24 DIAGNOSIS — R63.2 APPETITE INCREASE: Primary | ICD-10-CM

## 2023-04-24 DIAGNOSIS — E78.1 PURE HYPERTRIGLYCERIDEMIA: ICD-10-CM

## 2023-04-24 DIAGNOSIS — F17.200 CURRENT SMOKER: ICD-10-CM

## 2023-04-24 DIAGNOSIS — Z87.19 HISTORY OF ESOPHAGEAL STRICTURE: ICD-10-CM

## 2023-04-24 DIAGNOSIS — N92.4 EXCESSIVE BLEEDING IN PREMENOPAUSAL PERIOD: ICD-10-CM

## 2023-04-24 PROBLEM — R53.82 CHRONIC FATIGUE: Status: RESOLVED | Noted: 2021-10-20 | Resolved: 2023-04-24

## 2023-04-24 PROCEDURE — 3074F SYST BP LT 130 MM HG: CPT | Mod: CPTII,S$GLB,, | Performed by: STUDENT IN AN ORGANIZED HEALTH CARE EDUCATION/TRAINING PROGRAM

## 2023-04-24 PROCEDURE — 3078F PR MOST RECENT DIASTOLIC BLOOD PRESSURE < 80 MM HG: ICD-10-PCS | Mod: CPTII,S$GLB,, | Performed by: STUDENT IN AN ORGANIZED HEALTH CARE EDUCATION/TRAINING PROGRAM

## 2023-04-24 PROCEDURE — 99214 PR OFFICE/OUTPT VISIT, EST, LEVL IV, 30-39 MIN: ICD-10-PCS | Mod: S$GLB,,, | Performed by: STUDENT IN AN ORGANIZED HEALTH CARE EDUCATION/TRAINING PROGRAM

## 2023-04-24 PROCEDURE — 99999 PR PBB SHADOW E&M-EST. PATIENT-LVL IV: CPT | Mod: PBBFAC,,, | Performed by: STUDENT IN AN ORGANIZED HEALTH CARE EDUCATION/TRAINING PROGRAM

## 2023-04-24 PROCEDURE — 3074F PR MOST RECENT SYSTOLIC BLOOD PRESSURE < 130 MM HG: ICD-10-PCS | Mod: CPTII,S$GLB,, | Performed by: STUDENT IN AN ORGANIZED HEALTH CARE EDUCATION/TRAINING PROGRAM

## 2023-04-24 PROCEDURE — 3078F DIAST BP <80 MM HG: CPT | Mod: CPTII,S$GLB,, | Performed by: STUDENT IN AN ORGANIZED HEALTH CARE EDUCATION/TRAINING PROGRAM

## 2023-04-24 PROCEDURE — 99214 OFFICE O/P EST MOD 30 MIN: CPT | Mod: S$GLB,,, | Performed by: STUDENT IN AN ORGANIZED HEALTH CARE EDUCATION/TRAINING PROGRAM

## 2023-04-24 PROCEDURE — 99999 PR PBB SHADOW E&M-EST. PATIENT-LVL IV: ICD-10-PCS | Mod: PBBFAC,,, | Performed by: STUDENT IN AN ORGANIZED HEALTH CARE EDUCATION/TRAINING PROGRAM

## 2023-04-24 PROCEDURE — 1159F MED LIST DOCD IN RCRD: CPT | Mod: CPTII,S$GLB,, | Performed by: STUDENT IN AN ORGANIZED HEALTH CARE EDUCATION/TRAINING PROGRAM

## 2023-04-24 PROCEDURE — 3008F PR BODY MASS INDEX (BMI) DOCUMENTED: ICD-10-PCS | Mod: CPTII,S$GLB,, | Performed by: STUDENT IN AN ORGANIZED HEALTH CARE EDUCATION/TRAINING PROGRAM

## 2023-04-24 PROCEDURE — 1159F PR MEDICATION LIST DOCUMENTED IN MEDICAL RECORD: ICD-10-PCS | Mod: CPTII,S$GLB,, | Performed by: STUDENT IN AN ORGANIZED HEALTH CARE EDUCATION/TRAINING PROGRAM

## 2023-04-24 PROCEDURE — 3008F BODY MASS INDEX DOCD: CPT | Mod: CPTII,S$GLB,, | Performed by: STUDENT IN AN ORGANIZED HEALTH CARE EDUCATION/TRAINING PROGRAM

## 2023-04-24 RX ORDER — BUPROPION HYDROCHLORIDE 75 MG/1
75 TABLET ORAL 2 TIMES DAILY
Qty: 180 TABLET | Refills: 3 | Status: SHIPPED | OUTPATIENT
Start: 2023-04-24 | End: 2024-04-23

## 2023-04-24 RX ORDER — VARENICLINE TARTRATE 0.5 (11)-1
KIT ORAL
Qty: 1 EACH | Refills: 0 | Status: SHIPPED | OUTPATIENT
Start: 2023-04-24 | End: 2023-04-24

## 2023-04-24 RX ORDER — PANTOPRAZOLE SODIUM 40 MG/1
40 TABLET, DELAYED RELEASE ORAL DAILY
Qty: 90 TABLET | Refills: 3 | Status: SHIPPED | OUTPATIENT
Start: 2023-04-24 | End: 2024-04-23

## 2023-04-24 NOTE — PROGRESS NOTES
Subjective:       Patient ID: Betty Bill is a 42 y.o. female.    Chief Complaint: No chief complaint on file.      Active Problem List with Overview Notes    Diagnosis Date Noted    Pure hypertriglyceridemia 04/24/2023     Elevated triglycerides  vascepa BID   Dietary changes        Excessive bleeding in premenopausal period 04/24/2023     Heavy long cycles; no breaks in bleeding   GYN started on provera to stop bleeding which has helped  Plans for [facility] this summer  She says since starting provera she has been having a major increase in appetite and wants something to help manage this as she does not want to gain more weight due to being on provera  She is willing to try Wellbutrin since may also help with smoking cravings         History of esophageal stricture 04/24/2023     20 years ago had dilation   recently has been having more issues with foods getting stuck and liquids  She also feels difficult to breath at times and gets chest pains that she thinks also may be anxiety   She is willing to see GI and get scope   Will start PPI in meantime         Current smoker 10/20/2021     Wants to work on quitting  Interested in medication to help   Open to smoking cessation   She feels it has been difficult to breath lately so is more motivated to quit         Hearing loss, sensorineural 09/23/2014     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   HENT:  Positive for trouble swallowing.    Respiratory:  Positive for shortness of breath.    Cardiovascular:  Positive for chest pain.   Psychiatric/Behavioral:  The patient is nervous/anxious.    All other systems reviewed and are negative.     A1C:  Recent Labs   Lab 10/20/21  1521   Hemoglobin A1C 5.3     CBC:  Recent Labs   Lab 10/20/21  1521 04/06/23  1446   WBC 10.24 9.23   RBC 4.41 4.20   Hemoglobin 11.0 L 11.8 L   Hematocrit 35.8 L 36.5 L   Platelets 357 301   MCV 81 L 87    MCH 24.9 L 28.1   MCHC 30.7 L 32.3     CMP:  Recent Labs   Lab 10/20/21  1521 04/06/23  1446   Glucose 96 104   Calcium 9.3 8.9   Albumin 4.4  --    Total Protein 6.9  --    Sodium 139 139   Potassium 4.3 3.8   CO2 28 31 H   Chloride 103 103   BUN 16 16   Creatinine 0.74 0.66   Alkaline Phosphatase 76  --    ALT 17  --    AST 20  --    Total Bilirubin 0.2  --      LIPIDS:  Recent Labs   Lab 10/20/21  1521   TSH 2.080   HDL 34 L   Cholesterol 172   Triglycerides 312 H   LDL Cholesterol 75.6   HDL/Cholesterol Ratio 19.8 L   Non-HDL Cholesterol 138   Total Cholesterol/HDL Ratio 5.1 H     TSH:  Recent Labs   Lab 10/20/21  1521   TSH 2.080        Objective:      Vitals:    04/24/23 1059   BP: 128/72   Pulse: 83      Physical Exam  Vitals reviewed.   Constitutional:       Appearance: Normal appearance. She is overweight.   HENT:      Head: Normocephalic and atraumatic.   Eyes:      Conjunctiva/sclera: Conjunctivae normal.   Cardiovascular:      Rate and Rhythm: Normal rate and regular rhythm.      Heart sounds: Normal heart sounds.   Pulmonary:      Effort: Pulmonary effort is normal.      Breath sounds: Normal breath sounds.   Abdominal:      Palpations: Abdomen is soft.      Tenderness: There is no abdominal tenderness.   Musculoskeletal:         General: Normal range of motion.      Cervical back: Normal range of motion.      Right lower leg: No edema.      Left lower leg: No edema.   Neurological:      General: No focal deficit present.      Mental Status: She is alert and oriented to person, place, and time.   Psychiatric:         Mood and Affect: Mood normal.         Behavior: Behavior normal.        Assessment:       1. Appetite increase    2. Excessive bleeding in premenopausal period    3. History of esophageal stricture    4. Current smoker    5. Dysphagia, unspecified type    6. Pure hypertriglyceridemia        Plan:   1. Appetite increase  - buPROPion (WELLBUTRIN) 75 MG tablet; Take 1 tablet (75 mg total) by  mouth 2 (two) times daily.  Dispense: 180 tablet; Refill: 3    2. Excessive bleeding in premenopausal period    3. History of esophageal stricture  - pantoprazole (PROTONIX) 40 MG tablet; Take 1 tablet (40 mg total) by mouth once daily.  Dispense: 90 tablet; Refill: 3  - Ambulatory referral/consult to Gastroenterology; Future    4. Current smoker  - Ambulatory referral/consult to Smoking Cessation Program; Future  - buPROPion (WELLBUTRIN) 75 MG tablet; Take 1 tablet (75 mg total) by mouth 2 (two) times daily.  Dispense: 180 tablet; Refill: 3    5. Dysphagia, unspecified type  - pantoprazole (PROTONIX) 40 MG tablet; Take 1 tablet (40 mg total) by mouth once daily.  Dispense: 90 tablet; Refill: 3  - Ambulatory referral/consult to Gastroenterology; Future    6. Pure hypertriglyceridemia       Continue healthy lifestyle efforts  Will start trial of Wellbutrin for appetite, smoking and anxiety; may need to increase dose but will start with 75 BID   Refer to smoking cessation   Start protonix for dysphagia nd refer to GI for EGD and dilation   Continue current meds as prescribed otherwise; refills per request  Keep routine specialist f/u   RTC in 3 months  with labs prior and/or PRN        [facility]   4/24/23

## 2023-04-26 ENCOUNTER — TELEPHONE (OUTPATIENT)
Dept: FAMILY MEDICINE | Facility: CLINIC | Age: 43
End: 2023-04-26
Payer: COMMERCIAL

## 2023-05-15 ENCOUNTER — PATIENT MESSAGE (OUTPATIENT)
Dept: OBSTETRICS AND GYNECOLOGY | Facility: CLINIC | Age: 43
End: 2023-05-15
Payer: COMMERCIAL

## 2023-05-23 DIAGNOSIS — D21.9 FIBROIDS: ICD-10-CM

## 2023-05-23 DIAGNOSIS — N92.0 MENORRHAGIA WITH REGULAR CYCLE: Primary | ICD-10-CM

## 2023-05-23 RX ORDER — TRANEXAMIC ACID 650 MG/1
1300 TABLET ORAL 3 TIMES DAILY
Qty: 30 TABLET | Refills: 2 | Status: SHIPPED | OUTPATIENT
Start: 2023-05-23 | End: 2023-05-28

## 2023-06-02 ENCOUNTER — PATIENT MESSAGE (OUTPATIENT)
Dept: OBSTETRICS AND GYNECOLOGY | Facility: CLINIC | Age: 43
End: 2023-06-02
Payer: COMMERCIAL

## 2023-08-11 DIAGNOSIS — R22.41 KNEE MASS, RIGHT: Primary | ICD-10-CM

## 2023-10-02 ENCOUNTER — CLINICAL SUPPORT (OUTPATIENT)
Dept: OTHER | Facility: CLINIC | Age: 43
End: 2023-10-02

## 2023-10-02 DIAGNOSIS — Z00.8 ENCOUNTER FOR OTHER GENERAL EXAMINATION: ICD-10-CM

## 2023-10-03 VITALS
SYSTOLIC BLOOD PRESSURE: 141 MMHG | HEIGHT: 65 IN | DIASTOLIC BLOOD PRESSURE: 88 MMHG | WEIGHT: 165 LBS | BODY MASS INDEX: 27.49 KG/M2

## 2023-10-03 LAB
GLUCOSE SERPL-MCNC: 88 MG/DL (ref 60–140)
HDLC SERPL-MCNC: 37 MG/DL
POC CHOLESTEROL, LDL (DOCK): 89 MG/DL
POC CHOLESTEROL, TOTAL: 154 MG/DL
TRIGL SERPL-MCNC: 160 MG/DL

## 2024-02-23 ENCOUNTER — PATIENT MESSAGE (OUTPATIENT)
Dept: OBSTETRICS AND GYNECOLOGY | Facility: CLINIC | Age: 44
End: 2024-02-23
Payer: COMMERCIAL

## 2024-03-18 ENCOUNTER — OFFICE VISIT (OUTPATIENT)
Dept: OBSTETRICS AND GYNECOLOGY | Facility: CLINIC | Age: 44
End: 2024-03-18
Payer: COMMERCIAL

## 2024-03-18 VITALS
SYSTOLIC BLOOD PRESSURE: 167 MMHG | DIASTOLIC BLOOD PRESSURE: 101 MMHG | BODY MASS INDEX: 26.89 KG/M2 | WEIGHT: 161.63 LBS

## 2024-03-18 DIAGNOSIS — N94.6 DYSMENORRHEA: ICD-10-CM

## 2024-03-18 DIAGNOSIS — R10.2 PELVIC PAIN: ICD-10-CM

## 2024-03-18 DIAGNOSIS — Z01.419 WELL WOMAN EXAM WITH ROUTINE GYNECOLOGICAL EXAM: Primary | ICD-10-CM

## 2024-03-18 DIAGNOSIS — D25.9 UTERINE LEIOMYOMA, UNSPECIFIED LOCATION: ICD-10-CM

## 2024-03-18 PROCEDURE — 88141 CYTOPATH C/V INTERPRET: CPT | Mod: ,,, | Performed by: PATHOLOGY

## 2024-03-18 PROCEDURE — 99396 PREV VISIT EST AGE 40-64: CPT | Mod: S$GLB,,, | Performed by: STUDENT IN AN ORGANIZED HEALTH CARE EDUCATION/TRAINING PROGRAM

## 2024-03-18 PROCEDURE — 99999 PR PBB SHADOW E&M-EST. PATIENT-LVL III: CPT | Mod: PBBFAC,,, | Performed by: STUDENT IN AN ORGANIZED HEALTH CARE EDUCATION/TRAINING PROGRAM

## 2024-03-18 PROCEDURE — 87624 HPV HI-RISK TYP POOLED RSLT: CPT | Performed by: STUDENT IN AN ORGANIZED HEALTH CARE EDUCATION/TRAINING PROGRAM

## 2024-03-18 PROCEDURE — 3008F BODY MASS INDEX DOCD: CPT | Mod: CPTII,S$GLB,, | Performed by: STUDENT IN AN ORGANIZED HEALTH CARE EDUCATION/TRAINING PROGRAM

## 2024-03-18 PROCEDURE — 88175 CYTOPATH C/V AUTO FLUID REDO: CPT | Performed by: PATHOLOGY

## 2024-03-18 PROCEDURE — 1159F MED LIST DOCD IN RCRD: CPT | Mod: CPTII,S$GLB,, | Performed by: STUDENT IN AN ORGANIZED HEALTH CARE EDUCATION/TRAINING PROGRAM

## 2024-03-18 PROCEDURE — 3077F SYST BP >= 140 MM HG: CPT | Mod: CPTII,S$GLB,, | Performed by: STUDENT IN AN ORGANIZED HEALTH CARE EDUCATION/TRAINING PROGRAM

## 2024-03-18 PROCEDURE — 3080F DIAST BP >= 90 MM HG: CPT | Mod: CPTII,S$GLB,, | Performed by: STUDENT IN AN ORGANIZED HEALTH CARE EDUCATION/TRAINING PROGRAM

## 2024-03-18 RX ORDER — NAPROXEN 500 MG/1
500 TABLET ORAL 2 TIMES DAILY WITH MEALS
Qty: 60 TABLET | Refills: 2 | Status: SHIPPED | OUTPATIENT
Start: 2024-03-18 | End: 2025-03-18

## 2024-03-18 NOTE — PROGRESS NOTES
CC: Well woman exam    HPI:  Betty Bill is a 43 y.o. female  presents for a well woman exam.  She  reports having worse period pain/back pain that started a few days before her period then resolves once her period begins. Has been happening for last 2 months. Sometimes has hot flashes at night. Periods are sometimes dark brown/discharge. Has known fibroids, last US in  with multiple fibroids and planned hysterectomy but unsure if she wants to do it now.       Patient history:   Past Medical History:   Diagnosis Date    ADD (attention deficit disorder)     Anxiety     Deaf      Past Surgical History:   Procedure Laterality Date     SECTION      x 2    CHOLECYSTECTOMY      TUBAL LIGATION      at time of second C/D     OB History    Para Term  AB Living   2 2 2     2   SAB IAB Ectopic Multiple Live Births           2      # Outcome Date GA Lbr Waylon/2nd Weight Sex Delivery Anes PTL Lv   2 Term     M CS-LTranv   GAVIN   1 Term     F CS-LTranv   GAVIN       GYN  Menopausal: No  History of abnormal paps: DENIES  Abnormal or postmenopausal bleeding:  see HPI  History of abnormal mammograms:DENIES   Family history of breast or ovarian cancer: both  Any breast masses, pain, skin changes, or nipple discharge: DENIES  Possible recent STD exposure: denies  Contraception: none    Pap: 3/18/2024, Done today  Mammogram:  last       Family History   Problem Relation Age of Onset    Hypertension Mother     Stroke Mother     COPD Mother     Heart disease Father     Cancer Father         carcinoid    Hypertension Father     Hypertension Sister     Hypertension Sister     Hypertension Sister     Breast cancer Maternal Grandmother     Ovarian cancer Paternal Grandmother      Social History     Tobacco Use    Smoking status: Every Day     Current packs/day: 0.50     Types: Cigarettes    Smokeless tobacco: Never   Substance Use Topics    Alcohol use: Yes     Alcohol/week: 4.0 standard drinks of alcohol      Types: 4 Cans of beer per week     Comment: weekends     Allergies: Patient has no known allergies.    Current Outpatient Medications:     aspirin (ECOTRIN) 81 MG EC tablet, Take 81 mg by mouth 2 (two) times daily., Disp: , Rfl:     buPROPion (WELLBUTRIN) 75 MG tablet, Take 1 tablet (75 mg total) by mouth 2 (two) times daily., Disp: 180 tablet, Rfl: 3    celecoxib (CELEBREX) 200 MG capsule, Take 200 mg by mouth 2 (two) times daily with meals., Disp: , Rfl:     ibuprofen (ADVIL,MOTRIN) 600 MG tablet, Take 1 tablet (600 mg total) by mouth every 6 (six) hours as needed., Disp: 12 tablet, Rfl: 0    ibuprofen (ADVIL,MOTRIN) 800 MG tablet, Take 1 tablet (800 mg total) by mouth every 8 (eight) hours as needed for Pain., Disp: 30 tablet, Rfl: 0    icosapent ethyL (VASCEPA) 1 gram Cap, Take 2 capsules (2 g total) by mouth 2 (two) times daily., Disp: 60 capsule, Rfl: 6    medroxyPROGESTERone (PROVERA) 10 MG tablet, Take 2 tablets (20 mg total) by mouth once daily., Disp: 60 tablet, Rfl: 4    naproxen (NAPROSYN) 500 MG tablet, Take 1 tablet (500 mg total) by mouth 2 (two) times daily with meals., Disp: 60 tablet, Rfl: 2    ondansetron (ZOFRAN) 4 MG tablet, Take 4 mg by mouth every 8 (eight) hours., Disp: , Rfl:     pantoprazole (PROTONIX) 40 MG tablet, Take 1 tablet (40 mg total) by mouth once daily., Disp: 90 tablet, Rfl: 3    traMADoL (ULTRAM) 50 mg tablet, Take 50 mg by mouth every 8 (eight) hours as needed., Disp: , Rfl:        ROS:  GENERAL: Denies weight gain or weight loss. Feeling well overall.   SKIN: Denies rash or lesions.   HEAD: Denies head injury or headache.   NODES: Denies enlarged lymph nodes.   CHEST: Denies chest pain or shortness of breath.   CARDIOVASCULAR: Denies palpitations or left sided chest pain.   ABDOMEN: No abdominal pain, constipation, diarrhea, nausea, vomiting or rectal bleeding.   URINARY: No frequency, dysuria, hematuria, or burning on urination.  REPRODUCTIVE: See HPI.   BREASTS: The  patient performs breast self-examination and denies pain, lumps, or nipple discharge.   HEMATOLOGIC: No easy bruisability or excessive bleeding.  MUSCULOSKELETAL: Denies joint pain or swelling.   NEUROLOGIC: Denies syncope or weakness.   PSYCHIATRIC: Denies depression, anxiety or mood swings.    Objective:   BP (!) 167/101   Wt 73.3 kg (161 lb 9.6 oz)   BMI 26.89 kg/m²       Physical Exam:  APPEARANCE: Well nourished, well developed, in no acute distress.  AFFECT: WNL, alert and oriented x 3  SKIN: No acne or hirsutism  NECK: Neck symmetric without masses or thyromegaly  CHEST: Good respiratory effect  ABDOMEN: Soft.  No tenderness or masses.  No hepatosplenomegaly.  No hernias.  BREASTS: Symmetrical, no skin changes or visible lesions.  No palpable masses, nipple discharge bilaterally.  PELVIC: Normal external genitalia without lesions.  Normal hair distribution.  Adequate perineal body, normal urethral meatus.  Vagina moist and well rugated without lesions or discharge.  Cervix pink, without lesions, discharge or tenderness.  No significant cystocele or rectocele.  Bimanual exam shows uterus enlarged 10-12wk size, regular, mobile and nontender.  Adnexa without masses or tenderness.   EXTREMITIES: No edema.    ASSESSMENT AND PLAN  1. Well woman exam with routine gynecological exam  Liquid-Based Pap Smear, Screening    HPV High Risk Genotypes, PCR    Mammo Digital Screening Bilat w/ Ronen      2. Dysmenorrhea        3. Pelvic pain  US Pelvis Comp with Transvag NON-OB (xpd    naproxen (NAPROSYN) 500 MG tablet      4. Uterine leiomyoma, unspecified location  US Pelvis Comp with Transvag NON-OB (xpd    naproxen (NAPROSYN) 500 MG tablet          Annual exam  Breast and pelvic exam: wnl, enlarged uterus (known fibroids)   Patient counseled on ASCCP guidelines for cervical cytology screening  Cervical screening: done today  Patient counseled on current recommendations for breast cancer screening  Mammogram screening:  ordered  STD testing: not requested today   Osteoporosis screening at 65      2. AUB/pelvic pain   - discussed with patient possible due to fibroid size/pressure  - discussed continuing heating pad/warm bath, option to try motrin or naprosyn vs gabapentin, patient desires trial of naprosyn rx sent to pharmacy   - imaging: TVUS ordered  - Discussed medical and surgical treatment options with patient including OCP's, progesterone only (norethindrone 5mg daily vs provera 10-30mg daily), Lysteda, Mirena IUD, myfembree/oriahnn or surgical UAE/acessa if fibroids present, endometrial ablation, and hysterectomy.  Patient will consider.             She was counseled to follow up with her PCP for other routine health maintenance      Follow up in about 1 year (around 3/18/2025).      Stephanie Heaney, MD OBGYN [facility]

## 2024-03-21 ENCOUNTER — PATIENT MESSAGE (OUTPATIENT)
Dept: OBSTETRICS AND GYNECOLOGY | Facility: CLINIC | Age: 44
End: 2024-03-21
Payer: COMMERCIAL

## 2024-03-25 LAB
HPV HR 12 DNA SPEC QL NAA+PROBE: NEGATIVE
HPV16 AG SPEC QL: NEGATIVE
HPV18 DNA SPEC QL NAA+PROBE: NEGATIVE

## 2024-03-27 LAB
FINAL PATHOLOGIC DIAGNOSIS: ABNORMAL
Lab: ABNORMAL

## 2024-06-20 ENCOUNTER — OFFICE VISIT (OUTPATIENT)
Dept: FAMILY MEDICINE | Facility: CLINIC | Age: 44
End: 2024-06-20
Payer: COMMERCIAL

## 2024-06-20 VITALS
HEART RATE: 78 BPM | TEMPERATURE: 99 F | BODY MASS INDEX: 26.72 KG/M2 | SYSTOLIC BLOOD PRESSURE: 152 MMHG | DIASTOLIC BLOOD PRESSURE: 86 MMHG | HEIGHT: 65 IN | OXYGEN SATURATION: 98 % | WEIGHT: 160.38 LBS

## 2024-06-20 DIAGNOSIS — I10 PRIMARY HYPERTENSION: ICD-10-CM

## 2024-06-20 DIAGNOSIS — M62.838 NECK MUSCLE SPASM: Primary | ICD-10-CM

## 2024-06-20 PROCEDURE — 3079F DIAST BP 80-89 MM HG: CPT | Mod: CPTII,S$GLB,, | Performed by: STUDENT IN AN ORGANIZED HEALTH CARE EDUCATION/TRAINING PROGRAM

## 2024-06-20 PROCEDURE — 1159F MED LIST DOCD IN RCRD: CPT | Mod: CPTII,S$GLB,, | Performed by: STUDENT IN AN ORGANIZED HEALTH CARE EDUCATION/TRAINING PROGRAM

## 2024-06-20 PROCEDURE — 1160F RVW MEDS BY RX/DR IN RCRD: CPT | Mod: CPTII,S$GLB,, | Performed by: STUDENT IN AN ORGANIZED HEALTH CARE EDUCATION/TRAINING PROGRAM

## 2024-06-20 PROCEDURE — 3008F BODY MASS INDEX DOCD: CPT | Mod: CPTII,S$GLB,, | Performed by: STUDENT IN AN ORGANIZED HEALTH CARE EDUCATION/TRAINING PROGRAM

## 2024-06-20 PROCEDURE — 99999 PR PBB SHADOW E&M-EST. PATIENT-LVL IV: CPT | Mod: PBBFAC,,, | Performed by: STUDENT IN AN ORGANIZED HEALTH CARE EDUCATION/TRAINING PROGRAM

## 2024-06-20 PROCEDURE — 99214 OFFICE O/P EST MOD 30 MIN: CPT | Mod: S$GLB,,, | Performed by: STUDENT IN AN ORGANIZED HEALTH CARE EDUCATION/TRAINING PROGRAM

## 2024-06-20 PROCEDURE — 3077F SYST BP >= 140 MM HG: CPT | Mod: CPTII,S$GLB,, | Performed by: STUDENT IN AN ORGANIZED HEALTH CARE EDUCATION/TRAINING PROGRAM

## 2024-06-20 RX ORDER — VALSARTAN 160 MG/1
160 TABLET ORAL DAILY
Qty: 30 TABLET | Refills: 0 | Status: SHIPPED | OUTPATIENT
Start: 2024-06-20 | End: 2025-06-20

## 2024-06-20 RX ORDER — TIZANIDINE 4 MG/1
4 TABLET ORAL NIGHTLY PRN
Qty: 30 TABLET | Refills: 1 | Status: SHIPPED | OUTPATIENT
Start: 2024-06-20

## 2024-06-20 RX ORDER — MELOXICAM 15 MG/1
15 TABLET ORAL DAILY
Qty: 30 TABLET | Refills: 1 | Status: SHIPPED | OUTPATIENT
Start: 2024-06-20

## 2024-06-20 NOTE — PROGRESS NOTES
Subjective:      Patient ID: Betty Bill is a 43 y.o. female.    Chief Complaint: Shoulder Pain (Pt is having right shoulder pain and numbness to right hand and fingers. )    Pt offered  but declined she prefer to read lips  Started with pain in right shoulder and hand numbness after doing a lot of yard work  Tingling and numbness right hand  Tightness right side neck     Shoulder Pain   The pain is present in the neck and right shoulder. This is a new problem. The current episode started in the past 7 days. There has been no history of extremity trauma. The problem occurs constantly. The problem has been unchanged. The quality of the pain is described as aching, dull and burning. The pain is mild. Associated symptoms include numbness (thumb and index finger right hand) and tingling.     Review of Systems   Musculoskeletal:  Positive for arthralgias and myalgias.   Neurological:  Positive for tingling and numbness (thumb and index finger right hand).   All other systems reviewed and are negative.       Objective:     Vitals:    06/20/24 1115   BP: (!) 152/86   Pulse: 78   Temp: 98.6 °F (37 °C)      Physical Exam  Constitutional:       Appearance: Normal appearance.   HENT:      Head: Atraumatic.   Eyes:      Conjunctiva/sclera: Conjunctivae normal.   Neck:     Pulmonary:      Effort: Pulmonary effort is normal.      Breath sounds: Normal breath sounds.   Musculoskeletal:      Cervical back: Pain with movement and muscular tenderness present. Decreased range of motion.   Neurological:      General: No focal deficit present.      Mental Status: She is alert and oriented to person, place, and time.   Psychiatric:         Mood and Affect: Mood normal.         Behavior: Behavior normal.        Assessment:         1. Neck muscle spasm    2. Primary hypertension          Plan:   1. Neck muscle spasm  - meloxicam (MOBIC) 15 MG tablet; Take 1 tablet (15 mg total) by mouth once daily.  Dispense: 30 tablet;  Refill: 1  - tiZANidine (ZANAFLEX) 4 MG tablet; Take 1 tablet (4 mg total) by mouth nightly as needed (muscle spasm).  Dispense: 30 tablet; Refill: 1    2. Primary hypertension  - valsartan (DIOVAN) 160 MG tablet; Take 1 tablet (160 mg total) by mouth once daily.  Dispense: 30 tablet; Refill: 0     Start mobic in AM and zanaflex in PM x 2 weeks  Start valsartan for BP; check BP daily and record  RTC in 2 weeks for BP check and symptom check          [facility]   6/20/24

## 2024-06-26 DIAGNOSIS — I10 PRIMARY HYPERTENSION: ICD-10-CM

## 2024-07-02 ENCOUNTER — PATIENT MESSAGE (OUTPATIENT)
Dept: ADMINISTRATIVE | Facility: HOSPITAL | Age: 44
End: 2024-07-02
Payer: COMMERCIAL

## 2024-07-23 DIAGNOSIS — I10 PRIMARY HYPERTENSION: ICD-10-CM

## 2024-07-23 RX ORDER — VALSARTAN 160 MG/1
160 TABLET ORAL DAILY
Qty: 90 TABLET | Refills: 3 | Status: SHIPPED | OUTPATIENT
Start: 2024-07-23 | End: 2025-07-23

## 2024-07-23 NOTE — TELEPHONE ENCOUNTER
Care Due:                  Date            Visit Type   Department     Provider  --------------------------------------------------------------------------------                                MYCHART                              FOLLOWUP/OF  DESC FAMILY  Last Visit: 06-      [facility]  [facility] Irvinedgarrena  Next Visit: None Scheduled  None         None Found                                                            Last  Test          Frequency    Reason                     Performed    Due Date  --------------------------------------------------------------------------------    CBC.........  12 months..  meloxicam................  04- 03-    CMP.........  12 months..  meloxicam, valsartan.....  Not Found    Overdue    [employer] Embedded Care Due Messages. Reference number: [id number].   7/23/2024 9:21:14 AM CDT

## 2024-07-31 ENCOUNTER — PATIENT MESSAGE (OUTPATIENT)
Dept: ADMINISTRATIVE | Facility: HOSPITAL | Age: 44
End: 2024-07-31
Payer: COMMERCIAL

## 2024-08-09 PROBLEM — L02.415 ABSCESS OF KNEE, RIGHT: Status: ACTIVE | Noted: 2024-08-09

## 2024-08-09 PROBLEM — M00.9 SEPTIC ARTHRITIS OF KNEE, RIGHT: Status: ACTIVE | Noted: 2024-08-09

## 2024-08-09 PROBLEM — D72.829 LEUKOCYTOSIS: Status: ACTIVE | Noted: 2024-08-09

## 2024-08-09 PROBLEM — F41.9 ANXIETY: Status: ACTIVE | Noted: 2024-08-09

## 2024-08-10 PROBLEM — I10 ESSENTIAL HYPERTENSION, BENIGN: Status: ACTIVE | Noted: 2024-08-10

## 2024-09-03 ENCOUNTER — LAB VISIT (OUTPATIENT)
Dept: LAB | Facility: HOSPITAL | Age: 44
End: 2024-09-03
Attending: INTERNAL MEDICINE
Payer: COMMERCIAL

## 2024-09-03 DIAGNOSIS — T81.43XA INFECTION FOLLOWING A PROCEDURE, ORGAN AND SPACE SURGICAL SITE, INITIAL ENCOUNTER: Primary | ICD-10-CM

## 2024-09-03 LAB
ALBUMIN SERPL BCP-MCNC: 4.1 G/DL (ref 3.5–5.2)
ALP SERPL-CCNC: 97 U/L (ref 55–135)
ALT SERPL W/O P-5'-P-CCNC: 14 U/L (ref 10–44)
ANION GAP SERPL CALC-SCNC: 8 MMOL/L (ref 8–16)
AST SERPL-CCNC: 16 U/L (ref 10–40)
BASOPHILS # BLD AUTO: 0.07 K/UL (ref 0–0.2)
BASOPHILS NFR BLD: 0.8 % (ref 0–1.9)
BILIRUB SERPL-MCNC: 0.2 MG/DL (ref 0.1–1)
BUN SERPL-MCNC: 14 MG/DL (ref 6–20)
CALCIUM SERPL-MCNC: 9.9 MG/DL (ref 8.7–10.5)
CHLORIDE SERPL-SCNC: 104 MMOL/L (ref 95–110)
CO2 SERPL-SCNC: 26 MMOL/L (ref 23–29)
CREAT SERPL-MCNC: 0.6 MG/DL (ref 0.5–1.4)
CRP SERPL-MCNC: 8.6 MG/L (ref 0–8.2)
DIFFERENTIAL METHOD BLD: ABNORMAL
EOSINOPHIL # BLD AUTO: 1.1 K/UL (ref 0–0.5)
EOSINOPHIL NFR BLD: 11.7 % (ref 0–8)
ERYTHROCYTE [DISTWIDTH] IN BLOOD BY AUTOMATED COUNT: 13.3 % (ref 11.5–14.5)
EST. GFR  (NO RACE VARIABLE): >60 ML/MIN/1.73 M^2
GLUCOSE SERPL-MCNC: 85 MG/DL (ref 70–110)
HCT VFR BLD AUTO: 44.4 % (ref 37–48.5)
HGB BLD-MCNC: 15.1 G/DL (ref 12–16)
IMM GRANULOCYTES # BLD AUTO: 0.03 K/UL (ref 0–0.04)
IMM GRANULOCYTES NFR BLD AUTO: 0.3 % (ref 0–0.5)
LYMPHOCYTES # BLD AUTO: 2.8 K/UL (ref 1–4.8)
LYMPHOCYTES NFR BLD: 31.1 % (ref 18–48)
MCH RBC QN AUTO: 30.7 PG (ref 27–31)
MCHC RBC AUTO-ENTMCNC: 34 G/DL (ref 32–36)
MCV RBC AUTO: 90 FL (ref 82–98)
MONOCYTES # BLD AUTO: 0.6 K/UL (ref 0.3–1)
MONOCYTES NFR BLD: 6.4 % (ref 4–15)
NEUTROPHILS # BLD AUTO: 4.5 K/UL (ref 1.8–7.7)
NEUTROPHILS NFR BLD: 49.7 % (ref 38–73)
NRBC BLD-RTO: 0 /100 WBC
PLATELET # BLD AUTO: 329 K/UL (ref 150–450)
PMV BLD AUTO: 11 FL (ref 9.2–12.9)
POTASSIUM SERPL-SCNC: 4.8 MMOL/L (ref 3.5–5.1)
PROT SERPL-MCNC: 7.1 G/DL (ref 6–8.4)
RBC # BLD AUTO: 4.92 M/UL (ref 4–5.4)
SODIUM SERPL-SCNC: 138 MMOL/L (ref 136–145)
WBC # BLD AUTO: 9.04 K/UL (ref 3.9–12.7)

## 2024-09-03 PROCEDURE — 80053 COMPREHEN METABOLIC PANEL: CPT

## 2024-09-03 PROCEDURE — 85025 COMPLETE CBC W/AUTO DIFF WBC: CPT

## 2024-09-03 PROCEDURE — 86140 C-REACTIVE PROTEIN: CPT

## 2024-10-09 ENCOUNTER — PATIENT MESSAGE (OUTPATIENT)
Dept: ADMINISTRATIVE | Facility: HOSPITAL | Age: 44
End: 2024-10-09
Payer: COMMERCIAL

## 2024-10-10 ENCOUNTER — DOCUMENT SCAN (OUTPATIENT)
Dept: HOME HEALTH SERVICES | Facility: HOSPITAL | Age: 44
End: 2024-10-10
Payer: COMMERCIAL

## 2024-11-06 ENCOUNTER — DOCUMENT SCAN (OUTPATIENT)
Dept: HOME HEALTH SERVICES | Facility: HOSPITAL | Age: 44
End: 2024-11-06
Payer: COMMERCIAL

## 2024-11-15 ENCOUNTER — PATIENT MESSAGE (OUTPATIENT)
Dept: ADMINISTRATIVE | Facility: HOSPITAL | Age: 44
End: 2024-11-15
Payer: COMMERCIAL

## 2024-11-26 ENCOUNTER — PATIENT OUTREACH (OUTPATIENT)
Dept: ADMINISTRATIVE | Facility: HOSPITAL | Age: 44
End: 2024-11-26
Payer: COMMERCIAL

## 2024-11-26 VITALS — SYSTOLIC BLOOD PRESSURE: 120 MMHG | DIASTOLIC BLOOD PRESSURE: 80 MMHG

## 2025-02-17 DIAGNOSIS — I10 PRIMARY HYPERTENSION: ICD-10-CM

## 2025-02-18 RX ORDER — VALSARTAN 160 MG/1
160 TABLET ORAL DAILY
Qty: 90 TABLET | Refills: 3 | Status: SHIPPED | OUTPATIENT
Start: 2025-02-18 | End: 2026-02-18

## 2025-02-18 NOTE — TELEPHONE ENCOUNTER
No care due was identified.  [facility] Embedded Care Due Messages. Reference number: [id number].   2/17/2025 8:24:15 PM CST

## 2025-04-07 ENCOUNTER — OFFICE VISIT (OUTPATIENT)
Dept: URGENT CARE | Facility: CLINIC | Age: 45
End: 2025-04-07
Payer: COMMERCIAL

## 2025-04-07 VITALS
RESPIRATION RATE: 18 BRPM | HEART RATE: 73 BPM | DIASTOLIC BLOOD PRESSURE: 88 MMHG | WEIGHT: 164 LBS | OXYGEN SATURATION: 100 % | HEIGHT: 66 IN | SYSTOLIC BLOOD PRESSURE: 128 MMHG | BODY MASS INDEX: 26.36 KG/M2 | TEMPERATURE: 98 F

## 2025-04-07 DIAGNOSIS — S63.92XA SPRAIN OF LEFT HAND, INITIAL ENCOUNTER: Primary | ICD-10-CM

## 2025-04-07 DIAGNOSIS — S69.92XA HAND INJURY, LEFT, INITIAL ENCOUNTER: ICD-10-CM

## 2025-04-07 PROCEDURE — 73130 X-RAY EXAM OF HAND: CPT | Mod: LT,S$GLB,, | Performed by: RADIOLOGY

## 2025-04-07 PROCEDURE — 99203 OFFICE O/P NEW LOW 30 MIN: CPT | Mod: S$GLB,,, | Performed by: FAMILY MEDICINE

## 2025-04-07 RX ORDER — CELECOXIB 200 MG/1
CAPSULE ORAL
COMMUNITY
Start: 2024-11-25 | End: 2025-04-07

## 2025-04-07 RX ORDER — IBUPROFEN 600 MG/1
600 TABLET ORAL EVERY 8 HOURS PRN
Qty: 30 TABLET | Refills: 0 | Status: SHIPPED | OUTPATIENT
Start: 2025-04-07 | End: 2025-04-17

## 2025-04-07 RX ORDER — NAPROXEN 500 MG/1
500 TABLET ORAL 2 TIMES DAILY
COMMUNITY
Start: 2024-11-24

## 2025-04-07 NOTE — LETTER
April 7, 2025      [facility] [place]  [address] [address], [address]  [place] [address]  Phone: [phone]  Fax: [phone]       Patient: Betty Bill   YOB: 1980  Date of Visit: 04/07/2025    To Whom It May Concern:    Kia Bill  was at [facility] on 04/07/2025. The patient may return to work/school on 04/08/2025 with restrictions, use of left hand as tolerated until released by ortho. If you have any questions or concerns, or if I can be of further assistance, please do not hesitate to contact me.    Sincerely,    Lizette Hutchins, RT

## 2025-04-07 NOTE — PROGRESS NOTES
"Subjective:      Patient ID: Betty Bill is a 44 y.o. female.    Vitals:  height is 5' 5.5" (1.664 m) and weight is 74.4 kg (164 lb). Her oral temperature is 98.1 °F (36.7 °C). Her blood pressure is 128/88 and her pulse is 73. Her respiration is 18 and oxygen saturation is 100%.     Chief Complaint: Hand Injury (Left hand)    Pt fell yesterday and injured her left hand. She says her right knee gave out on her yesterday and she fell and braced herself with her left hand. She thinks she fell on her out stretched hand. She does have an appt with ortho wed for her knee pain. She went to work today but she is in pain. She tried bc powder this morning. She is right handed. She did break her left hand in childhood and says she had a pulled tendon to left hand on the left that required surgery. She did ice as well. She works in the cafeteria for the school system.     Hand Injury   Her dominant hand is their left hand. The incident occurred 12 to 24 hours ago. The incident occurred at home. The injury mechanism was a fall. The pain is present in the left hand. The quality of the pain is described as aching. The pain does not radiate. The pain is at a severity of 8/10. The pain is severe. The pain has been Fluctuating since the incident. Pertinent negatives include no chest pain, muscle weakness, numbness or tingling. The symptoms are aggravated by movement. She has tried ice and NSAIDs for the symptoms. The treatment provided no relief.       Constitution: Negative for activity change, appetite change, chills, sweating, fatigue, fever and unexpected weight change.   Cardiovascular:  Negative for chest pain.   Musculoskeletal:  Positive for pain, trauma, joint pain and joint swelling.   Skin:  Positive for bruising.   Neurological:  Negative for numbness.      Objective:     Physical Exam   Constitutional: She is oriented to person, place, and time. She appears well-developed.  Non-toxic appearance. She does not appear " ill. No distress.   HENT:   Head: Normocephalic and atraumatic.   Ears:   Right Ear: External ear normal.   Left Ear: External ear normal.   Nose: Nose normal.   Mouth/Throat: Oropharynx is clear and moist.   Eyes: Conjunctivae, EOM and lids are normal. Pupils are equal, round, and reactive to light.   Neck: Trachea normal and phonation normal. Neck supple.   Cardiovascular:   Pulses:       Radial pulses are 2+ on the right side.   Musculoskeletal:      Left wrist: She exhibits decreased range of motion, tenderness and swelling. She exhibits no bony tenderness and no crepitus.      Left hand: She exhibits decreased range of motion, tenderness, bony tenderness and swelling. She exhibits normal two-point discrimination, normal capillary refill, no deformity and no laceration. Normal sensation noted. Normal strength noted. Left middle finger: Exhibits tenderness (dip).   Neurological: She is alert and oriented to person, place, and time. She has normal motor skills and normal sensation. Coordination normal.   Skin: Skin is warm, dry, intact and not diaphoretic. bruising   Psychiatric: Her speech is normal and behavior is normal. Judgment and thought content normal.   Nursing note and vitals reviewed.    XR HAND COMPLETE 3 VIEW LEFT  Result Date: 4/7/2025  EXAMINATION: XR HAND COMPLETE 3 VIEW LEFT CLINICAL HISTORY: . Unspecified injury of left wrist, hand and finger(s), initial encounter TECHNIQUE: PA, lateral, and oblique views of the left hand were performed. COMPARISON: None FINDINGS: Metallic ring jewelry in place at the proximal 4th finger somewhat limiting evaluation.  Bones are well mineralized.  Mild ulnar minus variance..  No displaced fracture, dislocation or destructive osseous process. Joint spaces appear relatively maintained. Nonspecific soft tissue swelling along the dorsal aspect of the wrist and overlying the metacarpals.  No subcutaneous emphysema or radiopaque foreign body.     Dorsal wrist and hand  nonspecific soft tissue swelling, without acute displaced fracture-dislocation identified. Electronically signed by: Yanick Frank MD Date:    04/07/2025 Time:    15:14      Assessment:     1. Sprain of left hand, initial encounter    2. Hand injury, left, initial encounter        Plan:     Ice pack provided    3:32 PM   Reviewed x-ray with pt,  reviewed that with the swelling, may need repeat x-ray in 1-2 weeks if no improvement, we did place patient in thumb spica brace.  Encouraged NSAIDs, ice and elevation.  She will [facility].    Discussed results/diagnosis/plan with patient in clinic. Strict precautions given to patient to monitor for worsening signs and symptoms. Advised to follow up with PCP or specialist.    Explained side effects of medications prescribed with patient and informed him/her to discontinue use if he/she has any side effects and to inform UC or PCP if this occurs. All questions answered. Strict ED verses clinic return precautions stressed and given in depth. Advised if symptoms worsens of fail to improve he/she should go to the Emergency Room. Discharge and follow-up instructions given verbally/printed with the patient who expressed understanding and willingness to comply with my recommendations. Patient voiced understanding and in agreement with current treatment plan. Patient exits the exam room in no acute distress. Conversant and engaged during discharge discussion, verbalized understanding.      Sprain of left hand, initial encounter  -     THUMB ORTHOSIS SPLINT UNIVERSAL FOR HOME USE  -     ibuprofen (ADVIL,MOTRIN) 600 MG tablet; Take 1 tablet (600 mg total) by mouth every 8 (eight) hours as needed for Pain (as needed for pain, please take with food).  Dispense: 30 tablet; Refill: 0  -     Ambulatory referral/consult to Orthopedics    Hand injury, left, initial encounter  -     XR HAND COMPLETE 3 VIEW LEFT; Future; Expected date:  04/07/2025

## 2025-04-07 NOTE — PATIENT INSTRUCTIONS
General Discharge Instructions   PLEASE READ YOUR DISCHARGE INSTRUCTIONS ENTIRELY AS IT CONTAINS IMPORTANT INFORMATION.  If you were prescribed a narcotic or controlled medication, do not drive or operate heavy equipment or machinery while taking these medications.  If you were prescribed antibiotics, please take them to completion.  You must understand that you've received an Urgent Care treatment only and that you may be released before all your medical problems are known or treated. You, the patient, will arrange for follow up care as instructed.    OVER THE COUNTER RECOMMENDATIONS/SUGGESTIONS.      Tylenol up to 4,000 mg a day is safe for short periods and can be used for body aches, pain, and fever. However in high doses and prolonged use it can cause liver irritation.    Ibuprofen is a non-steroidal anti-inflammatory that can be used for body aches, pain, and fever.However it can also cause stomach irritation if over used.     Follow up with your PCP or specialty clinic as instructed in the next 2-3 days if not improved or as needed. You can call [phone] to schedule an appointment with appropriate provider.      If you condition worsens, we recommend that you receive another evaluation at the emergency room immediately or contact your primary medical clinic's after hours call service to discuss your concerns.      Please return here or go to the Emergency Department for any concerns or worsening condition.   You can also call [phone] to schedule an appointment with the appropriate provider.    Please return here or go to the Emergency Department for any concerns or worsening of condition.    Thank you for choosing [facility]!    Our goal in the Urgent Care is to always provide outstanding medical care. You may receive a survey by mail or e-mail in the next week regarding your experience today. We would greatly appreciate you completing and returning the survey. Your feedback provides us  with a way to recognize our staff who provide very good care, and it helps us learn how to improve when your experience was below our aspiration of excellence.      We appreciate you trusting us with your medical care. We hope you feel better soon. We will be happy to take care of you for all of your future medical needs.    Sincerely,    MASOUD Hess  Rest, iceRepeat X ray in 1 week if you still have pain. (DO NOT APPLY ICE DIRECTLY TO THE SKIN.  DO NOT LEAVE ON AFFECTED BODY PART FOR MORE THAN 15 MINUTES AT AT TIME TO AVOID INJURY TO SOFT TISSUE) and elevate the affected area keeping the ace wrap on for compression.   Repeat X ray in 1 week if you still have pain.  Follow up with orthopedics if the symptoms are not resolving as you thing they should

## 2025-04-07 NOTE — LETTER
April 7, 2025      [facility]  [address] [address], [address]  [place] [address]  Phone: [phone]  Fax: [phone]       Patient: Betty Bill   YOB: 1980  Date of Visit: 04/07/2025    To Whom It May Concern:    Kia Bill  was at [facility] on 04/07/2025. The patient may return to work/school on 04/08/2025 with restrictions, use of left hand as tolerated. If you have any questions or concerns, or if I can be of further assistance, please do not hesitate to contact me.    Sincerely,    Lizette Hutchins, RT

## 2025-04-23 DIAGNOSIS — Z12.31 OTHER SCREENING MAMMOGRAM: ICD-10-CM
